# Patient Record
Sex: FEMALE | Race: WHITE | NOT HISPANIC OR LATINO | Employment: OTHER | URBAN - METROPOLITAN AREA
[De-identification: names, ages, dates, MRNs, and addresses within clinical notes are randomized per-mention and may not be internally consistent; named-entity substitution may affect disease eponyms.]

---

## 2017-02-15 ENCOUNTER — ALLSCRIPTS OFFICE VISIT (OUTPATIENT)
Dept: OTHER | Facility: OTHER | Age: 72
End: 2017-02-15

## 2017-02-16 ENCOUNTER — GENERIC CONVERSION - ENCOUNTER (OUTPATIENT)
Dept: OTHER | Facility: OTHER | Age: 72
End: 2017-02-16

## 2017-02-23 ENCOUNTER — GENERIC CONVERSION - ENCOUNTER (OUTPATIENT)
Dept: OTHER | Facility: OTHER | Age: 72
End: 2017-02-23

## 2017-03-01 ENCOUNTER — GENERIC CONVERSION - ENCOUNTER (OUTPATIENT)
Dept: OTHER | Facility: OTHER | Age: 72
End: 2017-03-01

## 2017-03-20 ENCOUNTER — TRANSCRIBE ORDERS (OUTPATIENT)
Dept: ADMINISTRATIVE | Facility: HOSPITAL | Age: 72
End: 2017-03-20

## 2017-03-20 DIAGNOSIS — R22.1 MASS OF LEFT SIDE OF NECK: Primary | ICD-10-CM

## 2017-03-24 ENCOUNTER — HOSPITAL ENCOUNTER (OUTPATIENT)
Dept: RADIOLOGY | Age: 72
Discharge: HOME | End: 2017-03-24
Payer: COMMERCIAL

## 2017-03-24 DIAGNOSIS — R22.1 MASS OF LEFT SIDE OF NECK: ICD-10-CM

## 2017-03-24 PROCEDURE — 70491 CT SOFT TISSUE NECK W/DYE: CPT

## 2017-03-24 RX ADMIN — IOHEXOL 85 ML: 350 INJECTION, SOLUTION INTRAVENOUS at 14:17

## 2017-03-29 ENCOUNTER — TRANSCRIBE ORDERS (OUTPATIENT)
Dept: ADMINISTRATIVE | Facility: HOSPITAL | Age: 72
End: 2017-03-29

## 2017-03-29 DIAGNOSIS — R22.1 MASS OF LEFT SIDE OF NECK: Primary | ICD-10-CM

## 2017-04-05 ENCOUNTER — HOSPITAL ENCOUNTER (OUTPATIENT)
Dept: ULTRASOUND IMAGING | Facility: HOSPITAL | Age: 72
Discharge: HOME | End: 2017-04-05
Attending: OTOLARYNGOLOGY
Payer: COMMERCIAL

## 2017-04-05 DIAGNOSIS — R22.1 MASS OF LEFT SIDE OF NECK: ICD-10-CM

## 2017-04-05 PROCEDURE — 88173 CYTOPATH EVAL FNA REPORT: CPT | Performed by: RADIOLOGY

## 2017-04-05 PROCEDURE — 88184 FLOWCYTOMETRY/ TC 1 MARKER: CPT | Performed by: PATHOLOGY

## 2017-04-05 PROCEDURE — 76942 ECHO GUIDE FOR BIOPSY: CPT

## 2017-04-05 PROCEDURE — 88185 FLOWCYTOMETRY/TC ADD-ON: CPT

## 2017-04-05 PROCEDURE — 10022 HB FNA W/IMAGE: CPT

## 2017-04-05 PROCEDURE — 88172 CYTP DX EVAL FNA 1ST EA SITE: CPT | Performed by: RADIOLOGY

## 2017-04-05 RX ORDER — LIDOCAINE HYDROCHLORIDE 10 MG/ML
5 INJECTION, SOLUTION INFILTRATION; PERINEURAL ONCE
Status: COMPLETED | OUTPATIENT
Start: 2017-04-05 | End: 2017-04-05

## 2017-04-05 RX ADMIN — LIDOCAINE HYDROCHLORIDE 5 ML: 10 INJECTION, SOLUTION INFILTRATION; PERINEURAL at 13:00

## 2017-04-07 LAB — SCAN RESULT: NORMAL

## 2017-04-19 ENCOUNTER — ALLSCRIPTS OFFICE VISIT (OUTPATIENT)
Dept: OTHER | Facility: OTHER | Age: 72
End: 2017-04-19

## 2017-04-19 DIAGNOSIS — Z86.59 PERSONAL HISTORY OF OTHER MENTAL AND BEHAVIORAL DISORDERS: ICD-10-CM

## 2017-04-19 DIAGNOSIS — C82.90 FOLLICULAR LYMPHOMA (HCC): ICD-10-CM

## 2017-04-20 ENCOUNTER — GENERIC CONVERSION - ENCOUNTER (OUTPATIENT)
Dept: OTHER | Facility: OTHER | Age: 72
End: 2017-04-20

## 2017-04-20 ENCOUNTER — TRANSCRIBE ORDERS (OUTPATIENT)
Dept: ADMINISTRATIVE | Facility: HOSPITAL | Age: 72
End: 2017-04-20

## 2017-04-20 DIAGNOSIS — Z86.59 PERSONAL HISTORY OF AFFECTIVE DISORDER: ICD-10-CM

## 2017-04-20 DIAGNOSIS — C82.80 OTHER TYPE OF FOLLICULAR LYMPHOMA, UNSPECIFIED BODY REGION (HCC): Primary | ICD-10-CM

## 2017-04-27 ENCOUNTER — HOSPITAL ENCOUNTER (OUTPATIENT)
Dept: RADIOLOGY | Age: 72
Discharge: HOME | End: 2017-04-27
Payer: COMMERCIAL

## 2017-04-27 DIAGNOSIS — Z86.59 PERSONAL HISTORY OF AFFECTIVE DISORDER: ICD-10-CM

## 2017-04-27 DIAGNOSIS — C82.80 OTHER TYPE OF FOLLICULAR LYMPHOMA, UNSPECIFIED BODY REGION (HCC): ICD-10-CM

## 2017-04-27 LAB — GLUCOSE SERPL-MCNC: 92 MG/DL (ref 65–140)

## 2017-04-27 PROCEDURE — A9552 F18 FDG: HCPCS

## 2017-04-27 PROCEDURE — 78815 PET IMAGE W/CT SKULL-THIGH: CPT

## 2017-04-27 PROCEDURE — 82948 REAGENT STRIP/BLOOD GLUCOSE: CPT

## 2017-04-27 RX ADMIN — IOHEXOL 5 ML: 240 INJECTION, SOLUTION INTRATHECAL; INTRAVASCULAR; INTRAVENOUS; ORAL at 13:35

## 2017-05-09 ENCOUNTER — APPOINTMENT (OUTPATIENT)
Dept: LAB | Facility: MEDICAL CENTER | Age: 72
End: 2017-05-09
Payer: COMMERCIAL

## 2017-05-09 DIAGNOSIS — C82.90 FOLLICULAR LYMPHOMA (HCC): ICD-10-CM

## 2017-05-09 DIAGNOSIS — Z86.59 PERSONAL HISTORY OF OTHER MENTAL AND BEHAVIORAL DISORDERS: ICD-10-CM

## 2017-05-09 LAB
ALBUMIN SERPL BCP-MCNC: 3.6 G/DL (ref 3.5–5)
ALP SERPL-CCNC: 104 U/L (ref 46–116)
ALT SERPL W P-5'-P-CCNC: 30 U/L (ref 12–78)
ANION GAP SERPL CALCULATED.3IONS-SCNC: 7 MMOL/L (ref 4–13)
AST SERPL W P-5'-P-CCNC: 22 U/L (ref 5–45)
BASOPHILS # BLD AUTO: 0.02 THOUSANDS/ΜL (ref 0–0.1)
BASOPHILS NFR BLD AUTO: 0 % (ref 0–1)
BILIRUB SERPL-MCNC: 0.26 MG/DL (ref 0.2–1)
BUN SERPL-MCNC: 18 MG/DL (ref 5–25)
CALCIUM SERPL-MCNC: 9.1 MG/DL (ref 8.3–10.1)
CHLORIDE SERPL-SCNC: 103 MMOL/L (ref 100–108)
CO2 SERPL-SCNC: 28 MMOL/L (ref 21–32)
CREAT SERPL-MCNC: 0.68 MG/DL (ref 0.6–1.3)
EOSINOPHIL # BLD AUTO: 0.07 THOUSAND/ΜL (ref 0–0.61)
EOSINOPHIL NFR BLD AUTO: 1 % (ref 0–6)
ERYTHROCYTE [DISTWIDTH] IN BLOOD BY AUTOMATED COUNT: 14 % (ref 11.6–15.1)
GFR SERPL CREATININE-BSD FRML MDRD: >60 ML/MIN/1.73SQ M
GLUCOSE P FAST SERPL-MCNC: 97 MG/DL (ref 65–99)
HCT VFR BLD AUTO: 42.7 % (ref 34.8–46.1)
HGB BLD-MCNC: 14.4 G/DL (ref 11.5–15.4)
LYMPHOCYTES # BLD AUTO: 1.72 THOUSANDS/ΜL (ref 0.6–4.47)
LYMPHOCYTES NFR BLD AUTO: 30 % (ref 14–44)
MCH RBC QN AUTO: 33.1 PG (ref 26.8–34.3)
MCHC RBC AUTO-ENTMCNC: 33.7 G/DL (ref 31.4–37.4)
MCV RBC AUTO: 98 FL (ref 82–98)
MONOCYTES # BLD AUTO: 0.71 THOUSAND/ΜL (ref 0.17–1.22)
MONOCYTES NFR BLD AUTO: 12 % (ref 4–12)
NEUTROPHILS # BLD AUTO: 3.18 THOUSANDS/ΜL (ref 1.85–7.62)
NEUTS SEG NFR BLD AUTO: 57 % (ref 43–75)
NRBC BLD AUTO-RTO: 0 /100 WBCS
PLATELET # BLD AUTO: 235 THOUSANDS/UL (ref 149–390)
PMV BLD AUTO: 11.1 FL (ref 8.9–12.7)
POTASSIUM SERPL-SCNC: 4.3 MMOL/L (ref 3.5–5.3)
PROT SERPL-MCNC: 7.3 G/DL (ref 6.4–8.2)
RBC # BLD AUTO: 4.35 MILLION/UL (ref 3.81–5.12)
SODIUM SERPL-SCNC: 138 MMOL/L (ref 136–145)
WBC # BLD AUTO: 5.71 THOUSAND/UL (ref 4.31–10.16)

## 2017-05-09 PROCEDURE — 80053 COMPREHEN METABOLIC PANEL: CPT

## 2017-05-09 PROCEDURE — 36415 COLL VENOUS BLD VENIPUNCTURE: CPT

## 2017-05-09 PROCEDURE — 85025 COMPLETE CBC W/AUTO DIFF WBC: CPT

## 2017-05-10 ENCOUNTER — ALLSCRIPTS OFFICE VISIT (OUTPATIENT)
Dept: OTHER | Facility: OTHER | Age: 72
End: 2017-05-10

## 2017-05-31 ENCOUNTER — ALLSCRIPTS OFFICE VISIT (OUTPATIENT)
Dept: OTHER | Facility: OTHER | Age: 72
End: 2017-05-31

## 2017-06-07 ENCOUNTER — TRANSCRIBE ORDERS (OUTPATIENT)
Dept: ADMINISTRATIVE | Facility: HOSPITAL | Age: 72
End: 2017-06-07

## 2017-06-07 DIAGNOSIS — R59.0 BILATERAL HILAR ADENOPATHY SYNDROME: Primary | ICD-10-CM

## 2017-06-12 ENCOUNTER — GENERIC CONVERSION - ENCOUNTER (OUTPATIENT)
Dept: OTHER | Facility: OTHER | Age: 72
End: 2017-06-12

## 2017-06-15 ENCOUNTER — ALLSCRIPTS OFFICE VISIT (OUTPATIENT)
Dept: OTHER | Facility: OTHER | Age: 72
End: 2017-06-15

## 2017-06-27 ENCOUNTER — GENERIC CONVERSION - ENCOUNTER (OUTPATIENT)
Dept: OTHER | Facility: OTHER | Age: 72
End: 2017-06-27

## 2017-06-28 ENCOUNTER — GENERIC CONVERSION - ENCOUNTER (OUTPATIENT)
Dept: OTHER | Facility: OTHER | Age: 72
End: 2017-06-28

## 2017-07-17 ENCOUNTER — ALLSCRIPTS OFFICE VISIT (OUTPATIENT)
Dept: OTHER | Facility: OTHER | Age: 72
End: 2017-07-17

## 2017-10-02 DIAGNOSIS — R91.1 SOLITARY PULMONARY NODULE: ICD-10-CM

## 2017-10-11 ENCOUNTER — TRANSCRIBE ORDERS (OUTPATIENT)
Dept: ADMINISTRATIVE | Facility: HOSPITAL | Age: 72
End: 2017-10-11

## 2017-10-11 DIAGNOSIS — E04.1 LEFT THYROID NODULE: Primary | ICD-10-CM

## 2017-10-11 DIAGNOSIS — R91.1 LUNG NODULE: Primary | ICD-10-CM

## 2017-10-17 ENCOUNTER — GENERIC CONVERSION - ENCOUNTER (OUTPATIENT)
Dept: OTHER | Facility: OTHER | Age: 72
End: 2017-10-17

## 2017-10-17 LAB
AMBIG ABBREV CMP14 DEFAULT (HISTORICAL): NORMAL
BASOPHILS # BLD AUTO: 0 %
BASOPHILS # BLD AUTO: 0 X10E3/UL (ref 0–0.2)
DEPRECATED RDW RBC AUTO: 13.5 % (ref 12.3–15.4)
EOSINOPHIL # BLD AUTO: 0.1 X10E3/UL (ref 0–0.4)
EOSINOPHIL # BLD AUTO: 1 %
HCT VFR BLD AUTO: 43.2 % (ref 34–46.6)
HGB BLD-MCNC: 14.9 G/DL (ref 11.1–15.9)
IMM.GRANULOCYTES (CD4/8) (HISTORICAL): 0 %
IMM.GRANULOCYTES (CD4/8) (HISTORICAL): 0 X10E3/UL (ref 0–0.1)
LYMPHOCYTES # BLD AUTO: 2.1 X10E3/UL (ref 0.7–3.1)
LYMPHOCYTES # BLD AUTO: 33 %
MCH RBC QN AUTO: 32.9 PG (ref 26.6–33)
MCHC RBC AUTO-ENTMCNC: 34.5 G/DL (ref 31.5–35.7)
MCV RBC AUTO: 95 FL (ref 79–97)
MONOCYTES # BLD AUTO: 0.6 X10E3/UL (ref 0.1–0.9)
MONOCYTES (HISTORICAL): 10 %
NEUTROPHILS # BLD AUTO: 3.5 X10E3/UL (ref 1.4–7)
NEUTROPHILS # BLD AUTO: 56 %
PLATELET # BLD AUTO: 230 X10E3/UL (ref 150–379)
RBC (HISTORICAL): 4.53 X10E6/UL (ref 3.77–5.28)
WBC # BLD AUTO: 6.2 X10E3/UL (ref 3.4–10.8)

## 2017-10-18 ENCOUNTER — GENERIC CONVERSION - ENCOUNTER (OUTPATIENT)
Dept: OTHER | Facility: OTHER | Age: 72
End: 2017-10-18

## 2017-10-18 ENCOUNTER — TRANSCRIBE ORDERS (OUTPATIENT)
Dept: ADMINISTRATIVE | Facility: HOSPITAL | Age: 72
End: 2017-10-18

## 2017-10-18 DIAGNOSIS — C82.90 FOLLICULAR LYMPHOMA, UNSPECIFIED FOLLICULAR LYMPHOMA TYPE, UNSPECIFIED BODY REGION (HCC): Primary | ICD-10-CM

## 2017-10-18 LAB
A/G RATIO (HISTORICAL): 2.5 (ref 1.2–2.2)
ALBUMIN SERPL BCP-MCNC: 4.7 G/DL (ref 3.5–4.8)
ALP SERPL-CCNC: 91 IU/L (ref 39–117)
ALT SERPL W P-5'-P-CCNC: 21 IU/L (ref 0–32)
AST SERPL W P-5'-P-CCNC: 26 IU/L (ref 0–40)
BILIRUB SERPL-MCNC: 0.4 MG/DL (ref 0–1.2)
BUN SERPL-MCNC: 16 MG/DL (ref 8–27)
BUN/CREA RATIO (HISTORICAL): 22 (ref 12–28)
CALCIUM SERPL-MCNC: 9.5 MG/DL (ref 8.7–10.3)
CHLORIDE SERPL-SCNC: 96 MMOL/L (ref 96–106)
CO2 SERPL-SCNC: 25 MMOL/L (ref 18–29)
CREAT SERPL-MCNC: 0.74 MG/DL (ref 0.57–1)
EGFR AFRICAN AMERICAN (HISTORICAL): 94 ML/MIN/1.73
EGFR-AMERICAN CALC (HISTORICAL): 82 ML/MIN/1.73
GLUCOSE SERPL-MCNC: 96 MG/DL (ref 65–99)
LDH (HISTORICAL): 198 IU/L (ref 119–226)
POTASSIUM SERPL-SCNC: 4.1 MMOL/L (ref 3.5–5.2)
SODIUM SERPL-SCNC: 139 MMOL/L (ref 134–144)
TOT. GLOBULIN, SERUM (HISTORICAL): 1.9 G/DL (ref 1.5–4.5)
TOTAL PROTEIN (HISTORICAL): 6.6 G/DL (ref 6–8.5)

## 2017-10-20 ENCOUNTER — HOSPITAL ENCOUNTER (OUTPATIENT)
Dept: RADIOLOGY | Age: 72
Discharge: HOME | End: 2017-10-20
Payer: COMMERCIAL

## 2017-10-20 ENCOUNTER — APPOINTMENT (OUTPATIENT)
Dept: RADIOLOGY | Age: 72
End: 2017-10-20
Payer: COMMERCIAL

## 2017-10-20 DIAGNOSIS — C82.90 FOLLICULAR LYMPHOMA, UNSPECIFIED FOLLICULAR LYMPHOMA TYPE, UNSPECIFIED BODY REGION (HCC): ICD-10-CM

## 2017-10-20 DIAGNOSIS — E04.1 LEFT THYROID NODULE: ICD-10-CM

## 2017-10-20 PROCEDURE — 70491 CT SOFT TISSUE NECK W/DYE: CPT

## 2017-10-20 PROCEDURE — 76536 US EXAM OF HEAD AND NECK: CPT

## 2017-10-20 PROCEDURE — 71260 CT THORAX DX C+: CPT

## 2017-10-20 RX ADMIN — IOHEXOL 95 ML: 350 INJECTION, SOLUTION INTRAVENOUS at 15:33

## 2017-10-31 ENCOUNTER — APPOINTMENT (OUTPATIENT)
Dept: RADIATION ONCOLOGY | Facility: HOSPITAL | Age: 72
End: 2017-10-31
Attending: RADIOLOGY
Payer: COMMERCIAL

## 2017-10-31 ENCOUNTER — GENERIC CONVERSION - ENCOUNTER (OUTPATIENT)
Dept: OTHER | Facility: OTHER | Age: 72
End: 2017-10-31

## 2017-10-31 PROCEDURE — G0463 HOSPITAL OUTPT CLINIC VISIT: HCPCS | Performed by: RADIOLOGY

## 2017-10-31 PROCEDURE — 99214 OFFICE O/P EST MOD 30 MIN: CPT | Performed by: RADIOLOGY

## 2017-11-06 ENCOUNTER — GENERIC CONVERSION - ENCOUNTER (OUTPATIENT)
Dept: OTHER | Facility: OTHER | Age: 72
End: 2017-11-06

## 2017-11-06 DIAGNOSIS — R91.1 SOLITARY PULMONARY NODULE: ICD-10-CM

## 2017-12-26 ENCOUNTER — ALLSCRIPTS OFFICE VISIT (OUTPATIENT)
Dept: OTHER | Facility: OTHER | Age: 72
End: 2017-12-26

## 2017-12-26 ENCOUNTER — GENERIC CONVERSION - ENCOUNTER (OUTPATIENT)
Dept: OTHER | Facility: OTHER | Age: 72
End: 2017-12-26

## 2017-12-26 DIAGNOSIS — M25.551 PAIN IN RIGHT HIP: ICD-10-CM

## 2017-12-26 DIAGNOSIS — C82.90 FOLLICULAR LYMPHOMA (HCC): ICD-10-CM

## 2017-12-26 DIAGNOSIS — M54.50 LOW BACK PAIN: ICD-10-CM

## 2017-12-26 DIAGNOSIS — I65.22 OCCLUSION AND STENOSIS OF LEFT CAROTID ARTERY: ICD-10-CM

## 2017-12-26 DIAGNOSIS — M25.552 PAIN IN LEFT HIP: ICD-10-CM

## 2017-12-26 DIAGNOSIS — Z00.00 ENCOUNTER FOR GENERAL ADULT MEDICAL EXAMINATION WITHOUT ABNORMAL FINDINGS: ICD-10-CM

## 2017-12-26 DIAGNOSIS — R91.1 SOLITARY PULMONARY NODULE: ICD-10-CM

## 2017-12-26 DIAGNOSIS — Z13.220 ENCOUNTER FOR SCREENING FOR LIPOID DISORDERS: ICD-10-CM

## 2018-01-09 ENCOUNTER — GENERIC CONVERSION - ENCOUNTER (OUTPATIENT)
Dept: OTHER | Facility: OTHER | Age: 73
End: 2018-01-09

## 2018-01-09 NOTE — MISCELLANEOUS
Message   Recorded as Task   Date: 02/23/2017 10:41 AM, Created By: ROMAN VILLAGRAN   Task Name: Call Back   Assigned To: Tess Nicole   Regarding Patient: Ruby Rae, Status: In Progress   Comment:    Maria Alejandra Zhoua - 23 Feb 2017 10:41 AM     TASK CREATED  Pt called and stated she just spoke to you but she has another question  #: [phone]   CoreyTess - 23 Feb 2017 11:34 AM     TASK IN PROGRESS   CoreyTess - 23 Feb 2017 11:47 AM     TASK EDITED  spoke with patient  she wants appt with ENT in [employer]  they do not have appt until April    She does not want to go to [facility] to see  [place],," it is my obsessive complex"    She wants to make an apt    I gave her the contact info for Dr Geovany Bae and Dr Shanti Osei    I urged her to make appt ASAP        Active Problems    1  Amaurosis fugax of right eye (362 34) (G45 3)   2  Anxiety (300 00) (F41 9)   3  Atherosclerosis of native artery of right lower extremity with ulceration of other part of foot   (440 23,707 9) (I70 235)   4  Cancer of upper lobe of right lung (162 3) (C34 11)   5  Carotid artery stenosis (433 10) (I65 29)   6  COPD exacerbation (491 21) (J44 1)   7  Diastasis recti (728 84) (M62 08)   8  Encounter for screening colonoscopy (V76 51) (Z12 11)   9  Encounter for screening mammogram for breast cancer (V76 12) (Z12 31)   10  Follicular lymphoma (098 57) (C82 90)   11  History of OCD (obsessive compulsive disorder) (V11 2) (Z86 59)   12  Impacted cerumen (380 4) (H61 20)   13  Inguinal pain (789 09) (R10 30)   14  Irritable bowel syndrome (564 1) (K58 9)   15  Lung nodule (793 11) (R91 1)   16  Nicotine dependence (305 1) (F17 200)   17  Otalgia, unspecified laterality (388 70) (H92 09)   18  Other emphysema (492 8) (J43 8)   19  Otitis externa (380 10) (H60 90)   20  Painful joint (719 40) (M25 50)   21  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)   22  PTSD (post-traumatic stress disorder) (309 81) (F43 10)   23   Scoliosis (579 30) (M41 9)    Current Meds   1  Amoxicillin 500 MG Oral Capsule; TAKE 1 CAPSULE DAILY; Therapy: 86CZR6617 to (Evaluate:94Isw5772)  Requested for: 17Aug2016; Last   Rx:38Ujj1429 Ordered   2  Omeprazole 40 MG Oral Capsule Delayed Release; TAKE 1 CAPSULE DAILY; Therapy: 06LNW6706 to (Evaluate:21Acp4657)  Requested for: 91EJE3666; Last   Rx:03Rld4446 Ordered    Allergies    1  Flagyl CAPS   2  Avelox TABS   3  Penicillins    4  Grass   5  Other   6   Trees    Signatures   Electronically signed by : Anisa Jimenez, ; Feb 23 2017 11:47AM EST                       (Author)

## 2018-01-10 LAB
A/G RATIO (HISTORICAL): 1.6 (ref 1.2–2.2)
ALBUMIN SERPL BCP-MCNC: 4.4 G/DL (ref 3.5–4.8)
ALP SERPL-CCNC: 110 IU/L (ref 39–117)
ALT SERPL W P-5'-P-CCNC: 24 IU/L (ref 0–32)
AST SERPL W P-5'-P-CCNC: 30 IU/L (ref 0–40)
BASOPHILS # BLD AUTO: 0 %
BASOPHILS # BLD AUTO: 0 X10E3/UL (ref 0–0.2)
BILIRUB SERPL-MCNC: 0.3 MG/DL (ref 0–1.2)
BUN SERPL-MCNC: 13 MG/DL (ref 8–27)
BUN/CREA RATIO (HISTORICAL): 18 (ref 12–28)
CALCIUM SERPL-MCNC: 9.5 MG/DL (ref 8.7–10.3)
CHLORIDE SERPL-SCNC: 99 MMOL/L (ref 96–106)
CHOLEST SERPL-MCNC: 269 MG/DL (ref 100–199)
CO2 SERPL-SCNC: 26 MMOL/L (ref 18–29)
CREAT SERPL-MCNC: 0.74 MG/DL (ref 0.57–1)
DEPRECATED RDW RBC AUTO: 13.2 % (ref 12.3–15.4)
EGFR AFRICAN AMERICAN (HISTORICAL): 94 ML/MIN/1.73
EGFR-AMERICAN CALC (HISTORICAL): 81 ML/MIN/1.73
EOSINOPHIL # BLD AUTO: 0 %
EOSINOPHIL # BLD AUTO: 0 X10E3/UL (ref 0–0.4)
GLUCOSE SERPL-MCNC: 104 MG/DL (ref 65–99)
HCT VFR BLD AUTO: 44.7 % (ref 34–46.6)
HDLC SERPL-MCNC: 101 MG/DL
HGB BLD-MCNC: 14.7 G/DL (ref 11.1–15.9)
IMM.GRANULOCYTES (CD4/8) (HISTORICAL): 0 %
IMM.GRANULOCYTES (CD4/8) (HISTORICAL): 0 X10E3/UL (ref 0–0.1)
LDH (HISTORICAL): 196 IU/L (ref 119–226)
LDLC SERPL CALC-MCNC: 153 MG/DL (ref 0–99)
LYMPHOCYTES # BLD AUTO: 1.7 X10E3/UL (ref 0.7–3.1)
LYMPHOCYTES # BLD AUTO: 23 %
MCH RBC QN AUTO: 32.5 PG (ref 26.6–33)
MCHC RBC AUTO-ENTMCNC: 32.9 G/DL (ref 31.5–35.7)
MCV RBC AUTO: 99 FL (ref 79–97)
MONOCYTES # BLD AUTO: 0.6 X10E3/UL (ref 0.1–0.9)
MONOCYTES (HISTORICAL): 8 %
NEUTROPHILS # BLD AUTO: 5 X10E3/UL (ref 1.4–7)
NEUTROPHILS # BLD AUTO: 69 %
PLATELET # BLD AUTO: 234 X10E3/UL (ref 150–379)
POTASSIUM SERPL-SCNC: 4.5 MMOL/L (ref 3.5–5.2)
RBC (HISTORICAL): 4.53 X10E6/UL (ref 3.77–5.28)
SODIUM SERPL-SCNC: 140 MMOL/L (ref 134–144)
TOT. GLOBULIN, SERUM (HISTORICAL): 2.7 G/DL (ref 1.5–4.5)
TOTAL PROTEIN (HISTORICAL): 7.1 G/DL (ref 6–8.5)
TRIGL SERPL-MCNC: 74 MG/DL (ref 0–149)
VLDLC SERPL CALC-MCNC: 15 MG/DL (ref 5–40)
WBC # BLD AUTO: 7.4 X10E3/UL (ref 3.4–10.8)

## 2018-01-10 NOTE — PROGRESS NOTES
Discussion/Summary  Social Work-Discussion Summary [facility]: Patient is being seen for an ongoing assessment/follow up  LSW contacted pt as a follow up by phone on 3/1/2017  Pt confirmed she made ENT appointment with Dr Lolita Livingston  Pt denied barriers to attending this appointment  LSW provided list additional mental health providers in her area as she is having difficulty finding a provider accepting patients at this time  Pt was also provided phone number for [facility] for support at [phone] and crisis hotline at [phone] in case of psychiatric emergency  LSW is available to provide cancer care counseling as needed to pt        Signatures   Electronically signed by : LEVI Khan; Mar  1 2017  3:18PM EST                       (Author)

## 2018-01-10 NOTE — MISCELLANEOUS
Message   Recorded as Task   Date: 02/23/2017 09:51 AM, Created By: Caroline Morrison   Task Name: Call Back   Assigned To: Tess Nicole   Regarding Patient: Chino Romero, Status: Active   Comment:    SIDPDDELIA MOREJON - 23 Feb 2017 9:51 AM     TASK CREATED  PT called requesting a call back she states it is complicated and she will like to discuss with nurse instead  [phone]   Tess Nicole - 23 Feb 2017 10:08 AM     TASK EDITED  she is seeing an ENT in [address] [place] [address] on 2/27/17 for evaluation for a biopsy        Active Problems    1  Amaurosis fugax of right eye (362 34) (G45 3)   2  Anxiety (300 00) (F41 9)   3  Atherosclerosis of native artery of right lower extremity with ulceration of other part of foot   (440 23,707 9) (I70 235)   4  Cancer of upper lobe of right lung (162 3) (C34 11)   5  Carotid artery stenosis (433 10) (I65 29)   6  COPD exacerbation (491 21) (J44 1)   7  Diastasis recti (728 84) (M62 08)   8  Encounter for screening colonoscopy (V76 51) (Z12 11)   9  Encounter for screening mammogram for breast cancer (V76 12) (Z12 31)   10  Follicular lymphoma (820 79) (C82 90)   11  History of OCD (obsessive compulsive disorder) (V11 2) (Z86 59)   12  Impacted cerumen (380 4) (H61 20)   13  Inguinal pain (789 09) (R10 30)   14  Irritable bowel syndrome (564 1) (K58 9)   15  Lung nodule (793 11) (R91 1)   16  Nicotine dependence (305 1) (F17 200)   17  Otalgia, unspecified laterality (388 70) (H92 09)   18  Other emphysema (492 8) (J43 8)   19  Otitis externa (380 10) (H60 90)   20  Painful joint (719 40) (M25 50)   21  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)   22  PTSD (post-traumatic stress disorder) (309 81) (F43 10)   23  Scoliosis (737 30) (M41 9)    Current Meds   1  Amoxicillin 500 MG Oral Capsule; TAKE 1 CAPSULE DAILY; Therapy: 90NHY2208 to (Evaluate:16Oct2016)  Requested for: 17Aug2016; Last   Rx:17Aug2016 Ordered   2  Omeprazole 40 MG Oral Capsule Delayed Release; TAKE 1 CAPSULE DAILY;    Therapy: 06CEI7472 to (Evaluate:32Mao1667)  Requested for: 28KSU2204; Last   Rx:20Oct2015 Ordered    Allergies    1  Flagyl CAPS   2  Avelox TABS   3  Penicillins    4  Grass   5  Other   6   Trees    Signatures   Electronically signed by : Rachel Elmore, ; Feb 23 2017 10:08AM EST                       (Author)

## 2018-01-10 NOTE — MISCELLANEOUS
Message  patient darinel , wants a name of another surgeon besides carmel Mario and adrianna with contact information      Active Problems    1  Amaurosis fugax of right eye (362 34) (G45 3)   2  Anxiety (300 00) (F41 9)   3  Atherosclerosis of native artery of right lower extremity with ulceration of other part of foot   (440 23,707 9) (I70 235)   4  Cancer of upper lobe of right lung (162 3) (C34 11)   5  Carotid artery stenosis (433 10) (I65 29)   6  COPD exacerbation (491 21) (J44 1)   7  Diastasis recti (728 84) (M62 08)   8  Encounter for screening colonoscopy (V76 51) (Z12 11)   9  Encounter for screening mammogram for breast cancer (V76 12) (Z12 31)   10  Follicular lymphoma (844 64) (C82 90)   11  History of OCD (obsessive compulsive disorder) (V11 2) (Z86 59)   12  Impacted cerumen (380 4) (H61 20)   13  Inguinal pain (789 09) (R10 30)   14  Irritable bowel syndrome (564 1) (K58 9)   15  Lung nodule (793 11) (R91 1)   16  Lymphadenopathy, cervical (785 6) (R59 0)   17  Nicotine dependence (305 1) (F17 200)   18  Otalgia, unspecified laterality (388 70) (H92 09)   19  Other emphysema (492 8) (J43 8)   20  Otitis externa (380 10) (H60 90)   21  Painful joint (719 40) (M25 50)   22  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)   23  PTSD (post-traumatic stress disorder) (309 81) (F43 10)   24  Scoliosis (737 30) (M41 9)    Current Meds   1  Omeprazole 40 MG Oral Capsule Delayed Release; TAKE 1 CAPSULE DAILY; Therapy: 96OKU1555 to (Evaluate:26Rjb3475)  Requested for: 13YEB3506; Last   Rx:58Jlz3638 Ordered   2  Vicodin 5-300 MG Oral Tablet; TAKE 1 TABLET EVERY 4 TO 6 HOURS AS NEEDED   FOR PAIN;   Therapy: 10IAY6120 to (Evaluate:46Hbi7813); Last Rx:47Rec2562 Ordered    Allergies    1  Flagyl CAPS   2  Avelox TABS   3  Penicillins    4  Grass   5  Other   6   Trees    Signatures   Electronically signed by : Monique Dangelo, ; Jun 27 2017  3:38PM EST                       (Author)

## 2018-01-11 NOTE — PROGRESS NOTES
Preliminary Nursing Report                Patient Information    Initial Encounter Entry Date:   2017 2:48 PM EST (Automated Transmission Automated Transmission)       Last Modified:   {Fredi Panda}              Legal Name: Leandra Hall        Social  Number:        YOB: 1945        Age (years): 70        Gender: F        Body Mass Index (BMI): 20 kg/m2        Height: 59 in  Weight: 97 lbs (44 kgs)           Address:   [address] [id number] [place]              Phone: [phone]   (consent to leave messages)        Email:        Ethnicity: Decline to State        Latter-day:        Marital Status:        Preferred Language: English        Race: Other Race                    Patient Insurance Information        Primary Insurance Information Carrier Name: {Primary  CarrierName}           Carrier Address:   {Primary  CarrierAddress}              Carrier Phone: {Primary  CarrierPhone}          Group Number: {Primary  GroupNumber}          Policy Number: {Primary  PolicyNumber}          Insured Name: {Primary  InsuredName}          Insured : {Primary  InsuredDOB}          Relationship to Insured: {Primary  RelationshiptoInsured}           Secondary Insurance Information Carrier Name: {Secondary  CarrierName}           Carrier Address:   {Secondary  CarrierAddress}              Carrier Phone: {Secondary  CarrierPhone}          Group Number: {Secondary  GroupNumber}          Policy Number: {Secondary  PolicyNumber}          Insured Name: {Secondary  InsuredName}          Insured : {Secondary  InsuredDOB}          Relationship to Insured: {Secondary  RelationshiptoInsured}                       Health Profile   Booking #:   Madi Pope #: [id number]               DOS: 2017    Surgery : BIOPSY/REMOVAL,LYMPH NODE(S)      Add'l Procedures/Notes:     Surgery Risk: Intermediate          Precautions     PTSD (post-traumatic stress disorder)                   Allergies    Avelox TABS       Clinical Comments: Reaction Type: , Reaction: , Severity:        Flagyl CAPS       Clinical Comments: Reaction Type: , Reaction: , Severity:        Grass       Other       Penicillins       Trees       Clinical Comments: Reaction Type: , Reaction: , Severity:              Medications    Omeprazole 40 MG Oral Capsule Delayed Release       Vicodin 5-300 MG Oral Tablet               Conditions    Amaurosis fugax of right eye       Anxiety       Atherosclerosis of native artery of right lower extremity with ulceration of other part of foot       Cancer of upper lobe of right lung       Carotid artery stenosis       COPD exacerbation       Diastasis recti       Encounter for screening colonoscopy       Encounter for screening mammogram for breast cancer       Follicular lymphoma       History of OCD (obsessive compulsive disorder)       Impacted cerumen       Inguinal pain       Irritable bowel syndrome       Lung nodule       Lymphadenopathy, cervical       Nicotine dependence       Otalgia, unspecified laterality       Other emphysema       Otitis externa       Painful joint       Postherpetic polyneuropathy       PTSD (post-traumatic stress disorder)       Scoliosis               Family History    None             Surgical History    None             Social History    Current Every Day Smoker       Clinical Comments: Pt stated smokes 1/2 ppd for 30+ yrs;        Denies Alcohol Use (History)       Denies Caffeine Use       Denies Drug use       Recovering From Drug Addiction                               Patient Instructions       Medical Procedure Risk  NPO Instructions   The day before surgery it is recommended to have a light dinner at your usual time and you are allowed a light snack early in the evening  Do not eat anything heavy or eat a big meal after 7pm  Do not eat or drink anything after midnight prior to your surgery  If you are supposed to take any of your medications, do so with a sip of water  Failure to follow these instructions can lead to an increased risk of lung complications and may result in a delay or cancellation of your procedure  If you have any questions, contact your institution for further instructions  No candy, no gum, no mints, no chewing tobacco          Vicodin 5-300 MG Oral Tablet  Medication Instruction (Opioids - Pain Medication) 62  Please continue the following medications, if needed, up to and including the day of surgery (with a sip of water)  Nicotine dependence  Smoking Cessation   Smoking before and after surgery can lead to complications  Patients who quit smoking at least eight weeks before surgery have complication rates almost as low as non-smokers  Smokers who can stop smoking 24 or 48 hours before surgery may also benefit from decreased amounts of nicotine and carbon monoxide in the body  For help quitting smoking, speak with your physician or contact the [facility] Katie Romero or American [employer] Association  Please visit the following web address for assistance with quitting  [employer] be  [email or web address]  aspx  Testing Considerations       ? Chest X-ray (CXR) t  Consider a Chest X-Ray (CXR) if patient is having respiratory symptoms  Triggered by: Cancer of upper lobe of right lung, Carotid artery stenosis, Follicular lymphoma, Lung nodule         ? Coagulation Tests (PT/PTT/INR) t  Triggered by: Carotid artery stenosis, Follicular lymphoma         ? Complete Blood Count (CBC) t, client, client  If test was completed and normal within last six months, repeat test is not necessary  Triggered by: Carotid artery stenosis, Follicular lymphoma, Age or Facility Rec         ? Comprehensive Metabolic Panel (CMP) t  If test was completed and normal within last six months, repeat test is not necessary  Triggered by: Cancer of upper lobe of right lung, Follicular lymphoma         ?  Electrocardiogram (ECG) t  Patient does not need new test if normal ECG is present within the last six months and no change in clinical condition  Triggered by: Cancer of upper lobe of right lung, Carotid artery stenosis, Age or Facility Rec         ? Type and Screen client  Type and Screen - Blood: If there is anticipated or possible large blood loss with this procedure, then a Type and Screen for Blood should be ordered  Triggered by: Age or Facility Rec               Consultations       ? Cardiac Evaluation   Further evaluation of cardiopulmonary status should be strongly considered  Triggered by: Carotid artery stenosis         ? Primary Care Physician Evaluation   Primary care physician may need to evaluate patient prior to surgery  This is likely NOT necessary if the listed conditions are chronic and stable  Triggered by: Follicular lymphoma, Lung nodule, Otalgia, unspecified laterality               Miscellaneous Questions         Question: Are you able to walk up a flight of stairs, walk up a hill or do heavy housework WITHOUT having chest pain or shortness of breath? Answer: YES                   Allergies/Conditions/Medications Not Found        The following were not recognized by our system when generating the recommendations  Please consider if this would impact any preoperative protocols  ? COPD exacerbation       ? Current Every Day Smoker       ? Denies Alcohol Use (History)       ? Denies Caffeine Use       ? Encounter for screening mammogram for breast cancer       ? History of OCD (obsessive compulsive disorder)       ? Lymphadenopathy, cervical       ? Recovering From Drug Addiction                  Appointment Information         Date:    07/05/2017        Location:    [facility]        Address:           Directions:                      Footnotes revision 14      ?? Denotes a free-text entry        Legal Disclaimer: Any and all recommendations and services provided herein are designed to assist in the preoperative care of the patient  Nothing contained herein is designed to replace, eliminate or alleviate the responsibility of the attending physician to supervise and determine the patient?s preoperative care and course of treatment  Failure to provide complete, accurate information may negatively impact the system?s ability to recommend the proper preoperative protocol  THE ATTENDING PHYSICIAN IS RESPONSIBLE TO REVIEW THE SUGGESTED PREOPERATIVE PROTOCOLS/COURSE OF TREATMENT AND PRESCRIBE THE FINAL COURSE OF PREOPERATIVE TREATMENT IN CONSULTATION WITH THE PATIENT  THE ePREOP SYSTEM AND ITS MATERIALS ARE PROVIDED ? AS IS? WITHOUT WARRANTY OF ANY KIND, EXPRESS OR IMPLIED, INCLUDING, BUT NOT LIMITED TO, WARRANTIES OF PERFORMANCE OR MERCHANTABILITY OR FITNESS FOR A PARTICULAR PURPOSE  PATIENT AND PHYSICIANS HEREBY AGREE THAT THEIR USE OF THE MATERIALS AND RESOURCES ACT AS A CONSENT TO RELEASE AND WAIVE ePREOP FROM ANY AND ALL CLAIMS OF WARRANTY, TORT OR CONTRACT LAW OF ANY KIND  Electronically signed by:Víctor MOREJON    Jun 13 2017 10:54AM EST

## 2018-01-12 VITALS
OXYGEN SATURATION: 98 % | HEIGHT: 59 IN | TEMPERATURE: 97.3 F | DIASTOLIC BLOOD PRESSURE: 68 MMHG | WEIGHT: 97 LBS | SYSTOLIC BLOOD PRESSURE: 110 MMHG | BODY MASS INDEX: 19.56 KG/M2 | RESPIRATION RATE: 16 BRPM | HEART RATE: 85 BPM

## 2018-01-12 NOTE — MISCELLANEOUS
Message   Recorded as Task   Date: 10/18/2017 10:07 AM, Created By: Jamia Chao   Task Name: Care Coordination   Assigned To: Tess Nicole   Regarding Patient: Robin Abebe, Status: Active   Comment:    Tess Nicole - 18 Oct 2017 10:07 AM     TASK CREATED  cancel PET scan (denied by insurance)    please shedule ct chest and neck       patient is expecting your call   Carroll Kidd - 18 Oct 2017 10:56 AM     TASK REPLIED TO: Previously Assigned To Carroll Kidd  Scheduled patient for 10 20 17 at 3pm for both  Pt is aware  Anthony Ovalle is aware so she can get the [employer] [facility]  Active Problems    1  Amaurosis fugax of right eye (362 34) (G45 3)   2  Anxiety (300 00) (F41 9)   3  Atherosclerosis of native artery of right lower extremity with ulceration of other part of foot   (440 23,707 9) (I70 235)   4  Cancer of upper lobe of right lung (162 3) (C34 11)   5  Carotid artery stenosis (433 10) (I65 29)   6  COPD exacerbation (491 21) (J44 1)   7  Diastasis recti (728 84) (M62 08)   8  Encounter for screening colonoscopy (V76 51) (Z12 11)   9  Encounter for screening mammogram for breast cancer (V76 12) (Z12 31)   10  Follicular lymphoma (448 65) (C82 90)   11  History of OCD (obsessive compulsive disorder) (V11 2) (Z86 59)   12  Impacted cerumen (380 4) (H61 20)   13  Inguinal pain (789 09) (R10 30)   14  Irritable bowel syndrome (564 1) (K58 9)   15  Lung nodule (793 11) (R91 1)   16  Lymphadenopathy, cervical (785 6) (R59 0)   17  Nicotine dependence (305 1) (F17 200)   18  Otalgia, unspecified laterality (388 70) (H92 09)   19  Other emphysema (492 8) (J43 8)   20  Otitis externa (380 10) (H60 90)   21  Painful joint (719 40) (M25 50)   22  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

## 2018-01-12 NOTE — MISCELLANEOUS
Message  Patient did see ENT  He thought that her ear problem was related to TMJ, her ear seems to be fine  I do not think of this because patient stated that her symptoms began at the time her ear was cleaned out  It was suggested that she use ibuprofen to help with her symptoms        Signatures   Electronically signed by : LETICIA Srinivasan ; Mar 24 2016  9:50AM EST                       (Author)

## 2018-01-13 VITALS
HEART RATE: 61 BPM | BODY MASS INDEX: 19.6 KG/M2 | WEIGHT: 97.25 LBS | RESPIRATION RATE: 16 BRPM | DIASTOLIC BLOOD PRESSURE: 64 MMHG | SYSTOLIC BLOOD PRESSURE: 110 MMHG | HEIGHT: 59 IN | OXYGEN SATURATION: 99 % | TEMPERATURE: 96 F

## 2018-01-13 NOTE — MISCELLANEOUS
Message   Recorded as Task   Date: 04/20/2017 02:16 PM, Created By: Anali Massey   Task Name: Miscellaneous   Assigned To: Belkis Jones   Regarding Patient: Phuong Rose, Status: Active   Comment:    MaryseMary - 20 Apr 2017 2:16 PM     TASK CREATED  Caller: Self; Other; [phone] (Home)  CALLING TO ADVISE YOU THAT HER PET SCAN IS SCHEDULED FOR NEXT THURS, 4/27/17 AT [facility]  Active Problems    1  Amaurosis fugax of right eye (362 34) (G45 3)   2  Anxiety (300 00) (F41 9)   3  Atherosclerosis of native artery of right lower extremity with ulceration of other part of foot   (440 23,707 9) (I70 235)   4  Cancer of upper lobe of right lung (162 3) (C34 11)   5  Carotid artery stenosis (433 10) (I65 29)   6  COPD exacerbation (491 21) (J44 1)   7  Diastasis recti (728 84) (M62 08)   8  Encounter for screening colonoscopy (V76 51) (Z12 11)   9  Encounter for screening mammogram for breast cancer (V76 12) (Z12 31)   10  Follicular lymphoma (528 24) (C82 90)   11  History of OCD (obsessive compulsive disorder) (V11 2) (Z86 59)   12  Impacted cerumen (380 4) (H61 20)   13  Inguinal pain (789 09) (R10 30)   14  Irritable bowel syndrome (564 1) (K58 9)   15  Lung nodule (793 11) (R91 1)   16  Nicotine dependence (305 1) (F17 200)   17  Otalgia, unspecified laterality (388 70) (H92 09)   18  Other emphysema (492 8) (J43 8)   19  Otitis externa (380 10) (H60 90)   20  Painful joint (719 40) (M25 50)   21  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)   22  PTSD (post-traumatic stress disorder) (309 81) (F43 10)   23  Scoliosis (737 30) (M41 9)    Current Meds   1  Amoxicillin 500 MG Oral Capsule; TAKE 1 CAPSULE DAILY; Therapy: 48RSJ5255 to (Evaluate:16Oct2016)  Requested for: 81Yzm1652; Last   Rx:47Mdj8172 Ordered   2  Omeprazole 40 MG Oral Capsule Delayed Release; TAKE 1 CAPSULE DAILY; Therapy: 17IVB5766 to (Evaluate:38Uwh7636)  Requested for: 80NKJ8274; Last   Rx:20Oct2015 Ordered    Allergies    1   Flagyl CAPS   2  Avelox TABS   3  Penicillins    4  Grass   5  Other   6   Trees    Signatures   Electronically signed by : David Jeong, ; Apr 20 2017  2:22PM EST                       (Author)

## 2018-01-14 VITALS
SYSTOLIC BLOOD PRESSURE: 100 MMHG | OXYGEN SATURATION: 97 % | HEART RATE: 76 BPM | TEMPERATURE: 96.8 F | RESPIRATION RATE: 16 BRPM | HEIGHT: 59 IN | WEIGHT: 99 LBS | DIASTOLIC BLOOD PRESSURE: 60 MMHG | BODY MASS INDEX: 19.96 KG/M2

## 2018-01-14 VITALS
TEMPERATURE: 99.4 F | WEIGHT: 97.5 LBS | RESPIRATION RATE: 16 BRPM | BODY MASS INDEX: 19.66 KG/M2 | HEART RATE: 87 BPM | HEIGHT: 59 IN | DIASTOLIC BLOOD PRESSURE: 54 MMHG | SYSTOLIC BLOOD PRESSURE: 86 MMHG | OXYGEN SATURATION: 98 %

## 2018-01-14 VITALS
HEART RATE: 78 BPM | SYSTOLIC BLOOD PRESSURE: 105 MMHG | DIASTOLIC BLOOD PRESSURE: 64 MMHG | BODY MASS INDEX: 19.56 KG/M2 | TEMPERATURE: 98.8 F | HEIGHT: 59 IN | RESPIRATION RATE: 16 BRPM | WEIGHT: 97 LBS

## 2018-01-14 VITALS
OXYGEN SATURATION: 98 % | BODY MASS INDEX: 19.45 KG/M2 | TEMPERATURE: 98.7 F | HEIGHT: 59 IN | SYSTOLIC BLOOD PRESSURE: 102 MMHG | DIASTOLIC BLOOD PRESSURE: 66 MMHG | RESPIRATION RATE: 16 BRPM | WEIGHT: 96.5 LBS | HEART RATE: 80 BPM

## 2018-01-15 VITALS
BODY MASS INDEX: 19.96 KG/M2 | HEART RATE: 80 BPM | RESPIRATION RATE: 16 BRPM | HEIGHT: 59 IN | SYSTOLIC BLOOD PRESSURE: 110 MMHG | TEMPERATURE: 97.5 F | DIASTOLIC BLOOD PRESSURE: 62 MMHG | OXYGEN SATURATION: 97 % | WEIGHT: 99 LBS

## 2018-01-15 NOTE — MISCELLANEOUS
Message   Recorded as Task   Date: 06/27/2017 02:11 PM, Created By: Maribel Palm   Task Name: Care Coordination   Assigned To: Holly Espino   Regarding Patient: Frances Parry, Status: Active   Comment:    Maggy Lerner - 27 Jun 2017 2:11 PM     TASK CREATED  Patient called 6/27/17 and stated she wants to cancel surgery  Please call and follow up with patient   [facility] - 28 Jun 2017 2:56 PM     TASK EDITED  Per med onc nurse note, pt seeking surgery elsewhere  Procedure with Dr Angie Candelaria cancelled  Active Problems    1  Amaurosis fugax of right eye (362 34) (G45 3)   2  Anxiety (300 00) (F41 9)   3  Atherosclerosis of native artery of right lower extremity with ulceration of other part of foot   (440 23,707 9) (I70 235)   4  Cancer of upper lobe of right lung (162 3) (C34 11)   5  Carotid artery stenosis (433 10) (I65 29)   6  COPD exacerbation (491 21) (J44 1)   7  Diastasis recti (728 84) (M62 08)   8  Encounter for screening colonoscopy (V76 51) (Z12 11)   9  Encounter for screening mammogram for breast cancer (V76 12) (Z12 31)   10  Follicular lymphoma (599 88) (C82 90)   11  History of OCD (obsessive compulsive disorder) (V11 2) (Z86 59)   12  Impacted cerumen (380 4) (H61 20)   13  Inguinal pain (789 09) (R10 30)   14  Irritable bowel syndrome (564 1) (K58 9)   15  Lung nodule (793 11) (R91 1)   16  Lymphadenopathy, cervical (785 6) (R59 0)   17  Nicotine dependence (305 1) (F17 200)   18  Otalgia, unspecified laterality (388 70) (H92 09)   19  Other emphysema (492 8) (J43 8)   20  Otitis externa (380 10) (H60 90)   21  Painful joint (719 40) (M25 50)   22  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)   23  PTSD (post-traumatic stress disorder) (309 81) (F43 10)   24  Scoliosis (737 30) (M41 9)    Current Meds   1  Omeprazole 40 MG Oral Capsule Delayed Release; TAKE 1 CAPSULE DAILY; Therapy: 73ZCL7490 to (Evaluate:08Qwz1764)  Requested for: 71VCC2826; Last   Rx:20Oct2015 Ordered   2   Vicodin 5-300 MG Oral Tablet; TAKE 1 TABLET EVERY 4 TO 6 HOURS AS NEEDED   FOR PAIN;   Therapy: 00GLR2653 to (Evaluate:05Jun2017); Last Rx:71Shz4776 Ordered    Allergies    1  Flagyl CAPS   2  Avelox TABS   3  Penicillins    4  Grass   5  Other   6  Trees    Signatures   Electronically signed by :  Edythe Moritz, ; Jun 28 2017  2:56PM EST                       (Author)

## 2018-01-15 NOTE — PROGRESS NOTES
History of Present Illness  Care Coordination Encounter Information:   Type of Encounter: Telephonic   Contact: Initial Contact    Spoke to Patient  Care Coordination SL Nurse [facility]: Contacted patient for refusal to schedule vascular consult  Patient states she does not want to schedule at this time and will call central scheduling or the vascular center when she is ready to schedule  Provider tasked to make aware of continued refusal       Active Problems    1  Anxiety (300 00) (F41 9)   2  Carotid artery stenosis (433 10) (I65 29)   3  COPD exacerbation (491 21) (J44 1)   4  Diastasis recti (728 84) (M62 08)   5  Encounter for screening colonoscopy (V76 51) (Z12 11)   6  Encounter for screening mammogram for breast cancer (V76 12) (Z12 31)   7  Follicular lymphoma (317 33) (C82 90)   8  Impacted cerumen (380 4) (H61 20)   9  Inguinal pain (789 09) (R10 30)   10  Irritable bowel syndrome (564 1) (K58 9)   11  Lung nodule (793 11) (R91 1)   12  Otalgia, unspecified laterality (388 70) (H92 09)   13  Other emphysema (492 8) (J43 8)   14  Otitis externa (380 10) (H60 90)   15  Painful joint (719 40) (M25 50)   16  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)   17  Scoliosis (737 30) (M41 9)    Past Medical History    1  History of Acute otitis media, unspecified laterality   2  Acute sinusitis (461 9) (J01 90)   3  Carotid artery stenosis (433 10) (I65 29)   4  History of peptic ulcer (V12 71) (Z87 11)   5  Personal history of Helicobacter infection (V12 09) (Z86 19)    Surgical History    1  History of Appendectomy   2  History of Cholecystectomy   3  History of Gallbladder Surgery   4  History of Thyroid Surgery Jose-Thyroidectomy    Family History  Mother    1  Family history of Diabetes (250 00) (E11 9)   2  Family history of Heart disease (429 9) (I51 9)  Brother    3   Family history of Heart disease (429 9) (I51 9)    Social History    · Denied: Alcohol Use (History)   · Denied: Caffeine Use   · Current Every Day Smoker   · Denied: Drug use (305 90) (F19 90)   · Recovering From Drug Addiction    Current Meds    1  PHENobarbital 16 2 MG Oral Tablet; Take 1 tablet 4 times daily; Therapy: 48Zdw0592 to (Evaluate:05Pyy7124); Last Rx:14Apr2016 Ordered    2  PredniSONE 20 MG Oral Tablet; TAKE 1 TABLET DAILY; Therapy: 87KKK4914 to (Evaluate:41Dut2069); Last Rx:23Feb2016 Ordered   3  Ventolin  (90 Base) MCG/ACT Inhalation Aerosol Solution; INHALE 1 TO 2 PUFFS   EVERY 4 TO 6 HOURS AS NEEDED; Therapy: 10ZJQ4004 to (Last Rx:23Feb2016) Ordered    4  Amoxicillin 500 MG Oral Capsule; one capsule TID times 14 days; Therapy: 65SOF1095 to (Last Rx:15Mar2016)  Requested for: 69NZK6551 Ordered    5  Ciprodex 0 3-0 1 % Otic Suspension; INSTILL 3 DROPS IN AFFECTED EAR(S) TWICE   DAILY; Therapy: 00FHU3127 to (Evaluate:29Mar2016); Last Rx:15Mar2016 Ordered    6  Omeprazole 40 MG Oral Capsule Delayed Release; TAKE 1 CAPSULE DAILY; Therapy: 51NPE4079 to (Evaluate:54Yon2714)  Requested for: 97WTR3152; Last   Rx:20Oct2015 Ordered    7  Nystatin-Triamcinolone 903524-4 1 UNIT/GM-% External Cream; APPLY TO AFFECTED   AREA TWICE A DAY; Therapy: 38AAR5664 to (Evaluate:19Nov2015)  Requested for: 20Oct2015; Last   Rx:20Oct2015 Ordered    Allergies    1  Flagyl CAPS   2  Avelox TABS   3  Penicillins    Health Management   COLONOSCOPY; every 10 years; Next Due: 79BFY1944; Overdue   Medicare Annual Wellness Visit; every 1 year; Next Due: 82SDP5732; Overdue    End of Encounter Meds    1  PHENobarbital 16 2 MG Oral Tablet; Take 1 tablet 4 times daily; Therapy: 68Rij0769 to (Evaluate:62Cpn2161); Last Rx:14Apr2016 Ordered    2  PredniSONE 20 MG Oral Tablet; TAKE 1 TABLET DAILY; Therapy: 97JTL4138 to (Evaluate:45Ijq5372); Last Rx:23Feb2016 Ordered   3  Ventolin  (90 Base) MCG/ACT Inhalation Aerosol Solution; INHALE 1 TO 2 PUFFS   EVERY 4 TO 6 HOURS AS NEEDED; Therapy: 54SXW3897 to (Last Rx:34Cgx2543) Ordered    4  Amoxicillin 500 MG Oral Capsule; one capsule TID times 14 days; Therapy: 38RWJ7279 to (Last Rx:15Mar2016)  Requested for: 62SVL1620 Ordered    5  Ciprodex 0 3-0 1 % Otic Suspension; INSTILL 3 DROPS IN AFFECTED EAR(S) TWICE   DAILY; Therapy: 89KYV9549 to (Evaluate:29Mar2016); Last Rx:15Mar2016 Ordered    6  Omeprazole 40 MG Oral Capsule Delayed Release; TAKE 1 CAPSULE DAILY; Therapy: 09SSA9753 to (Evaluate:57Zyq5938)  Requested for: 10API1165; Last   Rx:20Oct2015 Ordered    7  Nystatin-Triamcinolone 198977-7 1 UNIT/GM-% External Cream; APPLY TO AFFECTED   AREA TWICE A DAY; Therapy: 41LMI7664 to (Evaluate:19Nov2015)  Requested for: 20Oct2015; Last   Rx:20Oct2015 Ordered    Future Appointments    Date/Time Provider Specialty Site   08/17/2016 02:00 PM LETICIA Canchola  Hematology Oncology CANCER CARE [facility] MEDICAL ONCOLOGY     Message   Recorded as Task  Date: 04/14/2016 10:49 AM, Created By: System  Task Name: Schedule Appointment  Assigned To: Thea Taylor  Regarding Patient: Lorenzo Canales, Status: In Progress  Comment:   System - 14 Apr 2016 10:49 AM    Preferred Communication: Home Phone  Ordering Site: [place] [address]    Referred To:  Ryan Branch MD, Sundar Ramirez  Vascular Surgery  To Be Done: 14 Apr 2016  [facility] - 14 Apr 2016 11:31 AM    TASK IN PROGRESS  Lo Alexandra - 14 Apr 2016 1:35 PM    TASK EDITED  Patient does not want to schedule this appointment she is being referred for Carotid Artery Stenosis 50-69%  Sneha Cummings - 15 Apr 2016 8:56 AM    TASK REASSIGNED: Previously Assigned To JAYDEN Hairston 1 - 15 Apr 2016 3:22 PM    TASK IN PROGRESS     Signatures   Electronically signed by : Albertina Scanlon RN; Apr 19 2016  3:22PM EST                       (Author)    Electronically signed by : Albertina Scanlon RN; May  2 2016  9:54AM EST                       (Author)

## 2018-01-15 NOTE — RESULT NOTES
Message  Abnormal vascular study indicating blockage of right subclavian as well as stenosis of carotid arteries have suggested that neurosurgery consultation      Signatures   Electronically signed by : LETICIA Aguirre ; Apr 14 2016  9:24AM EST                       (Author)

## 2018-01-16 NOTE — PROGRESS NOTES
Discussion/Summary  Social Work-Discussion Summary [facility]: Patient is being seen for an ongoing assessment/follow up  Pt is known to LSW briefly from rad onc  Pt contacted LSW for psychosocial support due to issues coping with recent referral to ENT and biopsy for lymph nodes/mass  Pt lives alone and denied she has a support system  Pt did mention she has a brother and two children, although she does not have a close relationship with them  Pt reports she has "OCD" and "PTSD"  Pt stopped mental health treatment as she feels medications cannot help her and the psychotherapist she was seeing was "not qualified to treat PTSD " Pt discussed having issues functioning in daily life due to her anxiety  LSW provided pt supportive counseling and encouraged pt to reconnect to mental healthcare due to the severity of her anxiety symptoms and limited social support  LSW provided pt list of psychotherapist that treat anxiety disorders in her area  Pt agreed to reestablish mental healthcare  Pt was encouraged to contact LSW if assistance is needed doing so  Pt discussed financial stressors and denied she qualified for lieap or the on track program  Pt was provided temporary financial assistance from the [facility]  LSW is available to provide cancer care counseling as needed        Signatures   Electronically signed by : LEVI García; Feb 20 2017  2:08PM EST                       (Author)

## 2018-01-18 ENCOUNTER — GENERIC CONVERSION - ENCOUNTER (OUTPATIENT)
Dept: OTHER | Facility: OTHER | Age: 73
End: 2018-01-18

## 2018-01-18 ENCOUNTER — HOSPITAL ENCOUNTER (OUTPATIENT)
Dept: RADIOLOGY | Age: 73
Discharge: HOME | End: 2018-01-18
Payer: COMMERCIAL

## 2018-01-18 VITALS — WEIGHT: 98 LBS | BODY MASS INDEX: 19.79 KG/M2

## 2018-01-18 DIAGNOSIS — R91.1 LUNG NODULE: ICD-10-CM

## 2018-01-18 DIAGNOSIS — C82.90 FOLLICULAR LYMPHOMA (HCC): ICD-10-CM

## 2018-01-18 LAB — GLUCOSE SERPL-MCNC: 95 MG/DL (ref 65–140)

## 2018-01-18 PROCEDURE — A9552 F18 FDG: HCPCS

## 2018-01-18 PROCEDURE — 78815 PET IMAGE W/CT SKULL-THIGH: CPT

## 2018-01-18 PROCEDURE — 82948 REAGENT STRIP/BLOOD GLUCOSE: CPT

## 2018-01-18 RX ADMIN — IOHEXOL 5 ML: 240 INJECTION, SOLUTION INTRATHECAL; INTRAVASCULAR; INTRAVENOUS; ORAL at 13:05

## 2018-01-22 VITALS
WEIGHT: 99 LBS | HEIGHT: 59 IN | DIASTOLIC BLOOD PRESSURE: 70 MMHG | RESPIRATION RATE: 16 BRPM | HEART RATE: 87 BPM | SYSTOLIC BLOOD PRESSURE: 104 MMHG | BODY MASS INDEX: 19.96 KG/M2 | TEMPERATURE: 97.9 F | OXYGEN SATURATION: 98 %

## 2018-01-22 VITALS
HEIGHT: 59 IN | OXYGEN SATURATION: 98 % | TEMPERATURE: 97.4 F | WEIGHT: 101 LBS | SYSTOLIC BLOOD PRESSURE: 108 MMHG | HEART RATE: 85 BPM | DIASTOLIC BLOOD PRESSURE: 72 MMHG | BODY MASS INDEX: 20.36 KG/M2

## 2018-01-24 VITALS
WEIGHT: 101 LBS | RESPIRATION RATE: 18 BRPM | BODY MASS INDEX: 20.36 KG/M2 | TEMPERATURE: 97.4 F | OXYGEN SATURATION: 98 % | DIASTOLIC BLOOD PRESSURE: 72 MMHG | SYSTOLIC BLOOD PRESSURE: 108 MMHG | HEIGHT: 59 IN

## 2018-01-29 ENCOUNTER — OFFICE VISIT (OUTPATIENT)
Dept: HEMATOLOGY ONCOLOGY | Facility: CLINIC | Age: 73
End: 2018-01-29
Payer: COMMERCIAL

## 2018-01-29 VITALS
HEIGHT: 59 IN | BODY MASS INDEX: 20.36 KG/M2 | TEMPERATURE: 97.9 F | DIASTOLIC BLOOD PRESSURE: 62 MMHG | SYSTOLIC BLOOD PRESSURE: 102 MMHG | RESPIRATION RATE: 16 BRPM | HEART RATE: 73 BPM | WEIGHT: 101 LBS

## 2018-01-29 DIAGNOSIS — C82.35 GRADE 3A FOLLICULAR LYMPHOMA OF LYMPH NODES OF INGUINAL REGION (HCC): Primary | ICD-10-CM

## 2018-01-29 DIAGNOSIS — R91.1 INCIDENTAL LUNG NODULE, GREATER THAN OR EQUAL TO 8MM: ICD-10-CM

## 2018-01-29 PROBLEM — C85.90 LYMPHOMA (HCC): Status: ACTIVE | Noted: 2018-01-29

## 2018-01-29 PROCEDURE — 99214 OFFICE O/P EST MOD 30 MIN: CPT | Performed by: INTERNAL MEDICINE

## 2018-01-29 NOTE — LETTER
January 29, 2018     Cecile Lynn, [address]  [place] [address]    Patient: Hyun Records   YOB: 1945   Date of Visit: 1/29/2018       Dear Dr Ata Yeboah:    Thank you for referring Jaclyn Gonzalez to me for evaluation  Below are my notes for this consultation  If you have questions, please do not hesitate to call me  I look forward to following your patient along with you  Sincerely,        Butch Taylor MD        CC: MD Butch Nolen MD  1/29/2018  2:23 PM  Sign at close encounter  Hematology Outpatient Follow - Up Note  Hyun Records 67 y o  female MRN: @ Encounter: [id number]        Date:  1/29/2018        Assessment/ Plan:  1  Follicular lymphoma stage IV diagnosed initially in 2009 with with constitutional symptoms she received single rituximab in September 2010 4 weekly doses with excellent response, by CT scan/PET scan, then she relapsed with inguinal lymphadenopathy treated with Eloy Brands /rituximab for 4 cycles complicated with herpes zoster status post radiation therapy to that area finished in August 2013, no evidence of disease by physical examination, normal LDH or lab criteria   2  Enlarging right lower lobe pulmonary nodule with uptake on the PET scan most likely consistent with malignancy, patient to be evaluated by Radiation Oncology for SB RT  3  Follow-up in 6 months with CBC, CMP, LDH            HPI:   #1  stage IV follicular lymphoma diagnosed in year of 2009 with B  symptoms received single agent rituximab in September 2010, 4 weekly doses with excellent response CT/PET scan in June 2012 showed significant decrease in lymph node size but persistent right inguinal lymphadenopathy she was treated with Treanda/rituximab for 4 cycles complicated with herpes zoster involving the right cervical, shoulder, breast area   Repeat PET scan showed persistent activity in the right inguinal area consistent with recurrent lymphoma patient received radiation therapy to that area and she is here for discussion  Radiation therapy finished in August 2013  #2  Obsessive-compulsive disorder  #3  Nicotine abuse  #4  Atherosclerosis of the Aorta  #5  Kyphosis  #6  Right upper lobe pulmonary nodule enlarging, the patient declined surgery in October 2015, biopsy was inconclusive no evidence of malignancy or lymphoma, However because of the appearance she had stereotactic radiation therapy  Interval History: She came for PET scan result which showed an enlarging with significant uptake in the right lower lobe lung nodule measuring 8 mm       Previous Treatment:         Test Results:    Imaging: Nm Pet Ct Skull Base To Mid Thigh    Result Date: 1/18/2018  Narrative: PET/CT SCAN INDICATION: R91 1: Solitary pulmonary nodule E09 27: Follicular lymphoma, unspecified, unspecified site J09 62: Follicular lymphoma, unspecified, lymph nodes of head, face, and neck  History taken directly from the electronic ordering system  Abnormal CT, increasing right lower lung nodule, restaging for treatment management MODIFIER:     PS COMPARISON: CT 10/20/2017 and priors, including PET CT 4/72/6095 CELL TYPE:  Follicular lymphoma, left cervical node biopsy 3/16/2010 TECHNIQUE:   8 1 mCi F-18-FDG administered IV  Multiplanar attenuation corrected and non attenuation corrected PET images are available for interpretation, and contiguous, low dose, axial CT sections were obtained from the vertex through the femurs following the administration of oral contrast material (7 5 cc Omnipaque-240 in 300 cc water)  Intravenous contrast material was not utilized  This examination, like all CT scans performed in the [facility], was performed utilizing techniques to minimize radiation dose exposure, including the use of iterative reconstruction and automated exposure control  Fasting serum glucose: 95 mg/dl FINDINGS: VISUALIZED BRAIN:   No acute abnormalities are seen   HEAD/NECK: Resolved left upper cervical hypermetabolic adenopathy  Stable mild right upper cervical hypermetabolic focus, SUV 3 1, prior study 3 4  No new FDG avid cervical adenopathy is seen  CT images: Otherwise stable  CHEST:   Stable mildly hypermetabolic right upper anterior pleural parenchymal densities, SUV 1 3, prior study 1 3, may represent posttreatment inflammatory changes  9 x 8 mm right lower lung nodule demonstrates increased hypermetabolism, SUV 3 3  Prior measurement 4 mm, SUV 0 9  This is most compatible with malignancy, possibly a primary lung malignancy  Resolved hypermetabolic soft tissue along the left lower paraspinal thoracic spine, SUV measuring 1 in this region, appearing similar to background soft tissue activity, prior SUV 2 8  Unchanged scattered areas of hilar and mediastinal FDG activity, SUV measuring up to 2 6, prior SUV 2 6  No new hypermetabolic lesions in this region  Small subcentimeter right axillary node with minimal FDG activity, SUV 1 4, may be reactive or related to the right upper extremity injection  This may be reassessed on follow-up exam  CT images: Emphysema  Coronary artery calcifications  ABDOMEN:   Somewhat nodular focus of activity in the right upper quadrant of the abdomen, SUV 4 2, may be related to the duodenum  This may be reassessed on follow-up exam   Diffuse gastric activity is again noted, likely physiologic  No additional FDG avid soft tissue lesions are seen  CT images: Stable  PELVIS: No FDG avid soft tissue lesions are seen  CT images: Stable  OSSEOUS STRUCTURES: No FDG avid lesions are seen  CT images: Spine degenerative change and scoliosis  Degenerative change in the hands  Impression: 1  Increased size and hypermetabolism of right lower lung nodule, most concerning for malignancy  2   Small mildly hypermetabolic right axillary node, possibly just reactive or related to the right upper extremity injection    This may be reassessed on follow-up exam to exclude progression  3   Stable distribution of mild hilar and mediastinal activity without new hypermetabolic adenopathy  4   Resolved left upper cervical hypermetabolic adenopathy  Stable focus of activity in the right upper neck  5   Resolved hypermetabolic soft tissue along the left paraspinal thoracic spine  Workstation performed: [id number]       Labs:   Lab Results   Component Value Date    WBC 7 4 01/09/2018    HGB 14 7 01/09/2018    HCT 44 7 01/09/2018    MCV 99 (H) 01/09/2018     01/09/2018     Lab Results   Component Value Date     01/09/2018    K 4 5 01/09/2018    CL 99 01/09/2018    CO2 26 01/09/2018    ANIONGAP 7 05/09/2017    BUN 13 01/09/2018    CREATININE 0 74 01/09/2018    GLUCOSE 104 (H) 01/09/2018    GLUF 97 05/09/2017    CALCIUM 9 5 01/09/2018    AST 30 01/09/2018    ALT 24 01/09/2018    ALKPHOS 110 01/09/2018    PROT 7 1 01/09/2018    BILITOT 0 3 01/09/2018    EGFR >60 0 05/09/2017        No results found for: IRON, TIBC, FERRITIN    No results found for: LUEPCZQH62      ROS:   Review of Systems   Constitutional: Negative for chills, diaphoresis, fatigue and fever  Respiratory: Negative for cough, choking, shortness of breath and wheezing  Musculoskeletal: Positive for back pain  Negative for gait problem, joint swelling, myalgias and neck pain  All other systems reviewed and are negative  Current Medications: Reviewed  Allergies: Reviewed  PMH/FH/SH:  Reviewed      Physical Exam:    Body surface area is 1 38 meters squared      Wt Readings from Last 3 Encounters:   01/29/18 45 8 kg (101 lb)   01/18/18 44 5 kg (98 lb)   12/26/17 45 8 kg (101 lb)        Temp Readings from Last 3 Encounters:   01/29/18 97 9 °F (36 6 °C) (Tympanic)   12/26/17 (!) 97 4 °F (36 3 °C)   12/26/17 (!) 97 4 °F (36 3 °C)        BP Readings from Last 3 Encounters:   01/29/18 102/62   12/26/17 108/72   12/26/17 108/72         Pulse Readings from Last 3 Encounters:   01/29/18 73 12/26/17 85   11/06/17 87        Physical Exam   Constitutional: No distress  Under weight   HENT:   Head: Normocephalic  Eyes: Pupils are equal, round, and reactive to light  Neck: Normal range of motion  Cardiovascular: Normal rate  Pulmonary/Chest: Effort normal    Abdominal: Soft  Musculoskeletal: Normal range of motion  Pronounced kyphosis   Neurological: She is alert  Skin: She is not diaphoretic  Goals and Barriers:  Current Goal: Minimize effects of disease  Barriers: None  Patient's Capacity to Self Care:  Patient is able to self care      Code Status: [unfilled]

## 2018-01-29 NOTE — PROGRESS NOTES
Hematology Outpatient Follow - Up Note  Alexa Hoyos 67 y o  female MRN: @ Encounter: [id number]        Date:  1/29/2018        Assessment/ Plan:  1  Follicular lymphoma stage IV diagnosed initially in 2009 with with constitutional symptoms she received single rituximab in September 2010 4 weekly doses with excellent response, by CT scan/PET scan, then she relapsed with inguinal lymphadenopathy treated with Reeda Weir /rituximab for 4 cycles complicated with herpes zoster status post radiation therapy to that area finished in August 2013, no evidence of disease by physical examination, normal LDH or lab criteria   2  Enlarging right lower lobe pulmonary nodule with uptake on the PET scan most likely consistent with malignancy, patient to be evaluated by Radiation Oncology for SB RT  3  Follow-up in 6 months with CBC, CMP, LDH            HPI:   #1  stage IV follicular lymphoma diagnosed in year of 2009 with B  symptoms received single agent rituximab in September 2010, 4 weekly doses with excellent response CT/PET scan in June 2012 showed significant decrease in lymph node size but persistent right inguinal lymphadenopathy she was treated with Treanda/rituximab for 4 cycles complicated with herpes zoster involving the right cervical, shoulder, breast area  Repeat PET scan showed persistent activity in the right inguinal area consistent with recurrent lymphoma patient received radiation therapy to that area and she is here for discussion  Radiation therapy finished in August 2013  #2  Obsessive-compulsive disorder  #3  Nicotine abuse  #4  Atherosclerosis of the Aorta  #5  Kyphosis  #6  Right upper lobe pulmonary nodule enlarging, the patient declined surgery in October 2015, biopsy was inconclusive no evidence of malignancy or lymphoma, However because of the appearance she had stereotactic radiation therapy       Interval History: She came for PET scan result which showed an enlarging with significant uptake in the right lower lobe lung nodule measuring 8 mm       Previous Treatment:         Test Results:    Imaging: Nm Pet Ct Skull Base To Mid Thigh    Result Date: 1/18/2018  Narrative: PET/CT SCAN INDICATION: R91 1: Solitary pulmonary nodule W84 84: Follicular lymphoma, unspecified, unspecified site F59 84: Follicular lymphoma, unspecified, lymph nodes of head, face, and neck  History taken directly from the electronic ordering system  Abnormal CT, increasing right lower lung nodule, restaging for treatment management MODIFIER:     PS COMPARISON: CT 10/20/2017 and priors, including PET CT 3/27/2063 CELL TYPE:  Follicular lymphoma, left cervical node biopsy 3/16/2010 TECHNIQUE:   8 1 mCi F-18-FDG administered IV  Multiplanar attenuation corrected and non attenuation corrected PET images are available for interpretation, and contiguous, low dose, axial CT sections were obtained from the vertex through the femurs following the administration of oral contrast material (7 5 cc Omnipaque-240 in 300 cc water)  Intravenous contrast material was not utilized  This examination, like all CT scans performed in the [facility], was performed utilizing techniques to minimize radiation dose exposure, including the use of iterative reconstruction and automated exposure control  Fasting serum glucose: 95 mg/dl FINDINGS: VISUALIZED BRAIN:   No acute abnormalities are seen  HEAD/NECK:   Resolved left upper cervical hypermetabolic adenopathy  Stable mild right upper cervical hypermetabolic focus, SUV 3 1, prior study 3 4  No new FDG avid cervical adenopathy is seen  CT images: Otherwise stable  CHEST:   Stable mildly hypermetabolic right upper anterior pleural parenchymal densities, SUV 1 3, prior study 1 3, may represent posttreatment inflammatory changes  9 x 8 mm right lower lung nodule demonstrates increased hypermetabolism, SUV 3 3  Prior measurement 4 mm, SUV 0 9    This is most compatible with malignancy, possibly a primary lung malignancy  Resolved hypermetabolic soft tissue along the left lower paraspinal thoracic spine, SUV measuring 1 in this region, appearing similar to background soft tissue activity, prior SUV 2 8  Unchanged scattered areas of hilar and mediastinal FDG activity, SUV measuring up to 2 6, prior SUV 2 6  No new hypermetabolic lesions in this region  Small subcentimeter right axillary node with minimal FDG activity, SUV 1 4, may be reactive or related to the right upper extremity injection  This may be reassessed on follow-up exam  CT images: Emphysema  Coronary artery calcifications  ABDOMEN:   Somewhat nodular focus of activity in the right upper quadrant of the abdomen, SUV 4 2, may be related to the duodenum  This may be reassessed on follow-up exam   Diffuse gastric activity is again noted, likely physiologic  No additional FDG avid soft tissue lesions are seen  CT images: Stable  PELVIS: No FDG avid soft tissue lesions are seen  CT images: Stable  OSSEOUS STRUCTURES: No FDG avid lesions are seen  CT images: Spine degenerative change and scoliosis  Degenerative change in the hands  Impression: 1  Increased size and hypermetabolism of right lower lung nodule, most concerning for malignancy  2   Small mildly hypermetabolic right axillary node, possibly just reactive or related to the right upper extremity injection  This may be reassessed on follow-up exam to exclude progression  3   Stable distribution of mild hilar and mediastinal activity without new hypermetabolic adenopathy  4   Resolved left upper cervical hypermetabolic adenopathy  Stable focus of activity in the right upper neck  5   Resolved hypermetabolic soft tissue along the left paraspinal thoracic spine      Workstation performed: [id number]       Labs:   Lab Results   Component Value Date    WBC 7 4 01/09/2018    HGB 14 7 01/09/2018    HCT 44 7 01/09/2018    MCV 99 (H) 01/09/2018     01/09/2018     Lab Results   Component Value Date     01/09/2018    K 4 5 01/09/2018    CL 99 01/09/2018    CO2 26 01/09/2018    ANIONGAP 7 05/09/2017    BUN 13 01/09/2018    CREATININE 0 74 01/09/2018    GLUCOSE 104 (H) 01/09/2018    GLUF 97 05/09/2017    CALCIUM 9 5 01/09/2018    AST 30 01/09/2018    ALT 24 01/09/2018    ALKPHOS 110 01/09/2018    PROT 7 1 01/09/2018    BILITOT 0 3 01/09/2018    EGFR >60 0 05/09/2017        No results found for: IRON, TIBC, FERRITIN    No results found for: VOMPJOSR03      ROS:   Review of Systems   Constitutional: Negative for chills, diaphoresis, fatigue and fever  Respiratory: Negative for cough, choking, shortness of breath and wheezing  Musculoskeletal: Positive for back pain  Negative for gait problem, joint swelling, myalgias and neck pain  All other systems reviewed and are negative  Current Medications: Reviewed  Allergies: Reviewed  PMH/FH/SH:  Reviewed      Physical Exam:    Body surface area is 1 38 meters squared  Wt Readings from Last 3 Encounters:   01/29/18 45 8 kg (101 lb)   01/18/18 44 5 kg (98 lb)   12/26/17 45 8 kg (101 lb)        Temp Readings from Last 3 Encounters:   01/29/18 97 9 °F (36 6 °C) (Tympanic)   12/26/17 (!) 97 4 °F (36 3 °C)   12/26/17 (!) 97 4 °F (36 3 °C)        BP Readings from Last 3 Encounters:   01/29/18 102/62   12/26/17 108/72   12/26/17 108/72         Pulse Readings from Last 3 Encounters:   01/29/18 73   12/26/17 85   11/06/17 87        Physical Exam   Constitutional: No distress  Under weight   HENT:   Head: Normocephalic  Eyes: Pupils are equal, round, and reactive to light  Neck: Normal range of motion  Cardiovascular: Normal rate  Pulmonary/Chest: Effort normal    Abdominal: Soft  Musculoskeletal: Normal range of motion  Pronounced kyphosis   Neurological: She is alert  Skin: She is not diaphoretic  Goals and Barriers:  Current Goal: Minimize effects of disease  Barriers: None        Patient's Capacity to Self Care:  Patient is able to self care      Code Status: [unfilled]

## 2018-01-31 ENCOUNTER — TELEPHONE (OUTPATIENT)
Dept: HEMATOLOGY ONCOLOGY | Facility: CLINIC | Age: 73
End: 2018-01-31

## 2018-01-31 DIAGNOSIS — C34.31 MALIGNANT NEOPLASM OF LOWER LOBE OF RIGHT LUNG (HCC): Primary | ICD-10-CM

## 2018-01-31 DIAGNOSIS — R91.1 LESION OF LUNG: Primary | ICD-10-CM

## 2018-02-06 ENCOUNTER — TELEPHONE (OUTPATIENT)
Dept: HEMATOLOGY ONCOLOGY | Facility: CLINIC | Age: 73
End: 2018-02-06

## 2018-02-06 NOTE — TELEPHONE ENCOUNTER
Pt contacted [facility] by phone on 2/5/2018  Pt is known to [facility] from her previous treatment  Pt discussed her current treatment plan, which included a pet scan, and a rad onc consult  Pt expressed gratitude that her cancer was found, and fears about the effectiveness of additional treatment  Pt discussed oncology psychological distress related to her history of "PTSD" and "OCD " Pt admits her psychological symtoms have "gotten worse," since dealing with changes with her cancer treatment plan  Pt denied she is connected to a mental healthcare treatment  The reason pt named for not receiving treatment is that she is "too busy," and fears about the cost of treatment  Pt also expressed financial concerns related to her cancer treatment  LSW provided pt supportive counseling and discussed the benefits of mental health treatment to address her psychological disorders  LSW also discussed financial resources available to cancer patients  Pt denied interest in scheduleing a follow up with [facility]  Pt stated she will contact [facility] directly for additional assistance

## 2018-02-09 PROBLEM — C34.31 CANCER OF LOWER LOBE OF RIGHT LUNG (HCC): Status: ACTIVE | Noted: 2018-02-09

## 2018-02-09 RX ORDER — OMEPRAZOLE 40 MG/1
1 CAPSULE, DELAYED RELEASE ORAL DAILY
COMMUNITY
Start: 2015-10-20

## 2018-02-12 ENCOUNTER — RADIATION ONCOLOGY CONSULT (OUTPATIENT)
Dept: RADIATION ONCOLOGY | Facility: HOSPITAL | Age: 73
End: 2018-02-12

## 2018-02-12 ENCOUNTER — APPOINTMENT (OUTPATIENT)
Dept: RADIATION ONCOLOGY | Facility: HOSPITAL | Age: 73
End: 2018-02-12
Attending: RADIOLOGY
Payer: COMMERCIAL

## 2018-02-12 DIAGNOSIS — C34.31 CANCER OF LOWER LOBE OF RIGHT LUNG (HCC): Primary | ICD-10-CM

## 2018-02-12 PROCEDURE — 99215 OFFICE O/P EST HI 40 MIN: CPT | Performed by: RADIOLOGY

## 2018-02-12 PROCEDURE — G0463 HOSPITAL OUTPT CLINIC VISIT: HCPCS | Performed by: RADIOLOGY

## 2018-02-12 NOTE — PROGRESS NOTES
Darien Hanson  1945  Ms Veronica Godfrey is a 67 y o  female  10/20/17 CT chest  IMPRESSION:     Slight interval enlargement of a 7 mm right lower lobe nodule, previously 4 mm   Possibility of a developing primary malignancy or metastasis cannot be excluded   Mild COPD   Consider thoracic surgery consultation   At minimum, 3 month follow-up CT is recommended      No pathologic adenopathy      1/18/18 PET  IMPRESSION:  1   Increased size and hypermetabolism of right lower lung nodule, most concerning for malignancy  2   Small mildly hypermetabolic right axillary node, possibly just reactive or related to the right upper extremity injection   This may be reassessed on follow-up exam to exclude progression  3   Stable distribution of mild hilar and mediastinal activity without new hypermetabolic adenopathy  4   Resolved left upper cervical hypermetabolic adenopathy   Stable focus of activity in the right upper neck  5   Resolved hypermetabolic soft tissue along the left paraspinal thoracic spine  Chief Complaint   Patient presents with    Consult     Lung Cancer       No matching staging information was found for the patient      Oncology History             Lymphoma ([address] )    2009 Initial Diagnosis     Lymphoma ([address] )         9/2010 -  Chemotherapy     rituximab x4         2012 -  Chemotherapy     Treanda/rituximab x 4 cycles         9/20/2013 - 10/7/2013 Radiation     Right inguinal node to 2400cGy           Cancer of upper lobe of right lung ([address] )    12/30/2015 -  Radiation     SBRT right upper lung         2015 Initial Diagnosis     Cancer of upper lobe of right lung ([address] )           Cancer of lower lobe of right lung ([address] )    2/9/2018 Initial Diagnosis     Cancer of lower lobe of right lung [facility])            Health Maintenance   Topic Date Due    Hepatitis C Screening  1945    [facility] PLAN OF CARE  1945    COLONOSCOPY  1945    PNEUMOCOCCAL POLYSACCHARIDE VACCINE AGE 65 AND OVER  10/24/2010    GLAUCOMA SCREENING 67+ YR  10/24/2012    DTaP,Tdap,and Td Vaccines (2 - Td) 05/25/2015    INFLUENZA VACCINE  09/01/2017       Patient Active Problem List   Diagnosis    Lymphoma ([facility] )    Incidental lung nodule, greater than or equal to 8mm    [facility]    Cancer of lower lobe of right lung (HCC)     Past Medical History:   Diagnosis Date    Chronic pain     GERD (gastroesophageal reflux disease)     Lymphoma (HCC)     OCD (obsessive compulsive disorder)     Psychiatric disorder      Past Surgical History:   Procedure Laterality Date    APPENDECTOMY      CHOLECYSTECTOMY      LUNG BIOPSY      THYROIDECTOMY, PARTIAL       Family History   Problem Relation Age of Onset    Diabetes Mother     Heart disease Mother     Heart disease Brother     Cancer Maternal Grandfather      Social History     Social History    Marital status:      Spouse name: N/A    Number of children: N/A    Years of education: N/A     Occupational History    Not on file  Social History Main Topics    Smoking status: Current Every Day Smoker     Types: Cigarettes    Smokeless tobacco: Never Used    Alcohol use No    Drug use: No    Sexual activity: Not on file     Other Topics Concern    Not on file     Social History Narrative    No narrative on file       Current Outpatient Prescriptions:     omeprazole (PriLOSEC) 40 MG capsule, Take 1 capsule by mouth daily, Disp: , Rfl:     Allergies   Allergen Reactions    Flagyl [Metronidazole]      Other reaction(s): lips swollen/numb nose    Moxifloxacin     Other      Grass, trees    Penicillins Hives     Other reaction(s): Hives     Review of Systems:  Review of Systems   Constitutional: Positive for fatigue  HENT: Negative  Eyes: Positive for pain (Right eye cyst) and visual disturbance (Right eye)  Respiratory: Positive for cough (Productive )  Cardiovascular: Positive for palpitations  Gastrointestinal: Negative  Endocrine: Positive for cold intolerance and heat intolerance  Genitourinary: Negative  Musculoskeletal: Positive for arthralgias (Hands), back pain and neck pain  Skin: Negative  Allergic/Immunologic: Negative  Neurological: Positive for light-headedness  Negative for dizziness and headaches  Hematological: Negative  Psychiatric/Behavioral: Positive for sleep disturbance  The patient is nervous/anxious  Review of Systems    Vitals:     02/12/18 1425   BP: 90/54   BP Location: Left arm   Patient Position: Sitting   Cuff Size: Standard   Pulse: 77   Resp: 16   Temp: 97 9 °F (36 6 °C)   TempSrc: Oral   Weight: 45 2 kg (99 lb 9 6 oz)   Height: 4' 11" (1 499 m)         Imaging:Nm Pet Ct Skull Base To Mid Thigh    Result Date: 1/18/2018  Narrative: PET/CT SCAN INDICATION: R91 1: Solitary pulmonary nodule B69 50: Follicular lymphoma, unspecified, unspecified site D72 44: Follicular lymphoma, unspecified, lymph nodes of head, face, and neck  History taken directly from the electronic ordering system  Abnormal CT, increasing right lower lung nodule, restaging for treatment management MODIFIER:     PS COMPARISON: CT 10/20/2017 and priors, including PET CT 2/52/4361 CELL TYPE:  Follicular lymphoma, left cervical node biopsy 3/16/2010 TECHNIQUE:   8 1 mCi F-18-FDG administered IV  Multiplanar attenuation corrected and non attenuation corrected PET images are available for interpretation, and contiguous, low dose, axial CT sections were obtained from the vertex through the femurs following the administration of oral contrast material (7 5 cc Omnipaque-240 in 300 cc water)  Intravenous contrast material was not utilized  This examination, like all CT scans performed in the [facility], was performed utilizing techniques to minimize radiation dose exposure, including the use of iterative reconstruction and automated exposure control   Fasting serum glucose: 95 mg/dl FINDINGS: VISUALIZED BRAIN:   No acute abnormalities are seen  HEAD/NECK:   Resolved left upper cervical hypermetabolic adenopathy  Stable mild right upper cervical hypermetabolic focus, SUV 3 1, prior study 3 4  No new FDG avid cervical adenopathy is seen  CT images: Otherwise stable  CHEST:   Stable mildly hypermetabolic right upper anterior pleural parenchymal densities, SUV 1 3, prior study 1 3, may represent posttreatment inflammatory changes  9 x 8 mm right lower lung nodule demonstrates increased hypermetabolism, SUV 3 3  Prior measurement 4 mm, SUV 0 9  This is most compatible with malignancy, possibly a primary lung malignancy  Resolved hypermetabolic soft tissue along the left lower paraspinal thoracic spine, SUV measuring 1 in this region, appearing similar to background soft tissue activity, prior SUV 2 8  Unchanged scattered areas of hilar and mediastinal FDG activity, SUV measuring up to 2 6, prior SUV 2 6  No new hypermetabolic lesions in this region  Small subcentimeter right axillary node with minimal FDG activity, SUV 1 4, may be reactive or related to the right upper extremity injection  This may be reassessed on follow-up exam  CT images: Emphysema  Coronary artery calcifications  ABDOMEN:   Somewhat nodular focus of activity in the right upper quadrant of the abdomen, SUV 4 2, may be related to the duodenum  This may be reassessed on follow-up exam   Diffuse gastric activity is again noted, likely physiologic  No additional FDG avid soft tissue lesions are seen  CT images: Stable  PELVIS: No FDG avid soft tissue lesions are seen  CT images: Stable  OSSEOUS STRUCTURES: No FDG avid lesions are seen  CT images: Spine degenerative change and scoliosis  Degenerative change in the hands  Impression: 1  Increased size and hypermetabolism of right lower lung nodule, most concerning for malignancy   2   Small mildly hypermetabolic right axillary node, possibly just reactive or related to the right upper extremity injection  This may be reassessed on follow-up exam to exclude progression  3   Stable distribution of mild hilar and mediastinal activity without new hypermetabolic adenopathy  4   Resolved left upper cervical hypermetabolic adenopathy  Stable focus of activity in the right upper neck  5   Resolved hypermetabolic soft tissue along the left paraspinal thoracic spine      Workstation performed: [id number]       Teaching SBRT lung

## 2018-02-12 NOTE — PROGRESS NOTES
Consultation - Radiation Oncology     WBF:[id number] : 1945  Encounter: [id number]  Patient Information: Evie Lance    Reason for Consult / Principal Problem:   Chief Complaint   Patient presents with    Consult     Lung Cancer            HPI: Evie Lance is a 67y o  year old female with 42+ pack year smoking history and past medical history of stage IV follicular lymphoma currently in remission, stage I lung carcinoma of the right upper lobe in , who now presents with clinical right lower lobe lesion consistent with early stage lung carcinoma  Briefly, the patient was diagnosed with stage IV follicular lymphoma in  and placed on observation until  when she became symptomatic  She received 4 doses of rituximab with significant response  She underwent consolidative Treanda/rituximab until 2013 for persistent right inguinal lymph node enlargement  Ultimately, she underwent right inguinal lymph node radiation to a dose of 2400 cGy ending in 2013  She remains clinically LYLY from lymphoma at this time  In  the patient was found with new right upper lobe lung nodule on serial CAT scans performed for lymphoma surveillance under Dr Olmos [place] care  Follow-up PET/CT demonstrated FDG avidity and enlargement  CT-guided needle lung biopsy on 2015 showed hyperplasia, but no definite malignancy  Her cellblock revealed atypical cellular changes and could not exclude a neoplasm  She suffered a pneumothorax after the biopsy which required admission to the hospital and chest tube placement  She declined resection and ultimately underwent SBRT in 2015, which she tolerated well  There is no evidence of recurrence of the right upper lobe lung carcinoma at this time  Follow-up chest CT from 10/20/2017 demonstrated slight interval enlargement of a 7 mm right lower lobe nodule, previously 4 mm    Follow-up PET-CT on 2018 demonstrated increased size and hypermetabolic in the right lower lobe lung nodule, now measuring 9 mm with an SUV of 3 3 (previous PET 4/2017 SUV of 0 9)  Findings  Her most concerning for malignancy  Small mildly hypermetabolic right axillary node thought to  Be reactive or related to injection  Follow-up was recommended  Stable distribution of mild hilar and mediastinal activity without new hypermetabolic adenopathy  Currently, the patient denies any respiratory symptoms  She is a current smoker and has episodic cough productive of clear sputum  She denies any persistent cough  She denies any fevers  She denies any hemoptysis  She denies any shortness of breath at rest    She denies weight loss  The patient has a history of psychiatric disorder including anxiety and OCD  She states that her anxiety resulting shortness of breath  The patient complains of lower back pain and hip pain related to scoliosis as well as traumatic back injury in October 2017  She does not take pain medication  She has discussed this with her PCP, but has not sought further treatment at this time  She is considering follow-up at spine clinic  Pain has been stable, but she is concerned about lying flat on her back for prolonged periods of time  She  Also complains of neuropathic pains in the right shoulder  This has been stable for approximately 4 years after about of varicella zoster this area  She has been treated with Lyrica in the past, but states that she cannot tolerate side effects  She has not sought further attention with her PCP at this time  There are no new areas of pain  She continues to smoke about half a pack of cigarettes per day  Review of Systems:  Review of Systems   Constitutional: Negative  HENT: Negative  Respiratory: Positive for cough  Negative for shortness of breath, wheezing and stridor  Cardiovascular: Negative  Gastrointestinal: Negative  Endocrine: Negative  Genitourinary: Negative  Musculoskeletal: Positive for arthralgias, back pain (as per HPI) and neck pain (as per HPI)  Skin: Negative  Allergic/Immunologic: Negative  Neurological: Positive for light-headedness and headaches  Negative for speech difficulty  Neuropathy of the right shoulder as per HPI  Periodic lightheadedness   Hematological: Negative  Psychiatric/Behavioral: The patient is nervous/anxious           OCD disorder       ECO - Symptomatic but completely ambulatory    Oncology History             Lymphoma ([address] [address] )     Initial Diagnosis     Lymphoma ([address] [address] )         2010 -  Chemotherapy     rituximab x4          -  Chemotherapy     Treanda/rituximab x 4 cycles         2013 - 10/7/2013 Radiation     Right inguinal node to 2400cGy           Cancer of upper lobe of right lung ([address] )    2015 -  Radiation     SBRT right upper lung          Initial Diagnosis     Cancer of upper lobe of right lung ([address] )           Cancer of lower lobe of right lung ([address] )    2018 Initial Diagnosis     Cancer of lower lobe of right lung [facility])         Oncology History             Lymphoma ([address] [address] )     Initial Diagnosis     Lymphoma ([address] )         2010 -  Chemotherapy     rituximab x4          -  Chemotherapy     Treanda/rituximab x 4 cycles         2013 - 10/7/2013 Radiation     Right inguinal node to 2400cGy           Cancer of upper lobe of right lung ([address] )    2015 -  Radiation     SBRT right upper lung          Initial Diagnosis     Cancer of upper lobe of right lung ([address] )           Cancer of lower lobe of right lung ([address] )    2018 Initial Diagnosis     Cancer of lower lobe of right lung ([address] )            Historical Information   Past Medical History:   Diagnosis Date    Chronic pain     GERD (gastroesophageal reflux disease)     Lymphoma (HCC)     OCD (obsessive compulsive disorder)     Psychiatric disorder      Past Surgical History:   Procedure Laterality Date    APPENDECTOMY  CHOLECYSTECTOMY      LUNG BIOPSY      THYROIDECTOMY, PARTIAL       Family History   Problem Relation Age of Onset    Diabetes Mother     Heart disease Mother     Heart disease Brother     Cancer Maternal Grandfather      Social History   History   Alcohol Use No     History   Drug Use No     History   Smoking Status    Current Every Day Smoker    Types: Cigarettes   Smokeless Tobacco    Never Used     Meds/Allergies   Allergies   Allergen Reactions    Flagyl [Metronidazole]      Other reaction(s): lips swollen/numb nose    Moxifloxacin     Other      Grass, trees    Penicillins Hives     Other reaction(s): Hives       Current Meds:     Current Outpatient Prescriptions:     omeprazole (PriLOSEC) 40 MG capsule, Take 1 capsule by mouth daily, Disp: , Rfl:       OBJECTIVE:   There were no vitals taken for this visit  Physical Exam:   Physical Exam   Constitutional: She is oriented to person, place, and time  She appears well-developed and well-nourished  No distress  Eyes: Conjunctivae are normal  No scleral icterus  Cardiovascular: Normal rate, regular rhythm and normal heart sounds  No murmur heard  Pulmonary/Chest: Effort normal  No respiratory distress  She has no wheezes  She has no rales  Decreased breath sounds throughout   Abdominal: Soft  She exhibits no distension  There is no tenderness  Musculoskeletal: She exhibits no edema  Lymphadenopathy:     She has no cervical adenopathy  Neurological: She is alert and oriented to person, place, and time  Skin: Skin is warm and dry  Nursing note and vitals reviewed  Imaging Studies:  Nm Pet Ct Skull Base To Mid Thigh    Result Date: 1/18/2018  Narrative: PET/CT SCAN INDICATION: R91 1: Solitary pulmonary nodule P46 09: Follicular lymphoma, unspecified, unspecified site I22 77: Follicular lymphoma, unspecified, lymph nodes of head, face, and neck  History taken directly from the electronic ordering system   Abnormal CT, increasing right lower lung nodule, restaging for treatment management MODIFIER:     PS COMPARISON: CT 10/20/2017 and priors, including PET CT 2/87/6093 CELL TYPE:  Follicular lymphoma, left cervical node biopsy 3/16/2010 TECHNIQUE:   8 1 mCi F-18-FDG administered IV  Multiplanar attenuation corrected and non attenuation corrected PET images are available for interpretation, and contiguous, low dose, axial CT sections were obtained from the vertex through the femurs following the administration of oral contrast material (7 5 cc Omnipaque-240 in 300 cc water)  Intravenous contrast material was not utilized  This examination, like all CT scans performed in the [facility], was performed utilizing techniques to minimize radiation dose exposure, including the use of iterative reconstruction and automated exposure control  Fasting serum glucose: 95 mg/dl FINDINGS: VISUALIZED BRAIN:   No acute abnormalities are seen  HEAD/NECK:   Resolved left upper cervical hypermetabolic adenopathy  Stable mild right upper cervical hypermetabolic focus, SUV 3 1, prior study 3 4  No new FDG avid cervical adenopathy is seen  CT images: Otherwise stable  CHEST:   Stable mildly hypermetabolic right upper anterior pleural parenchymal densities, SUV 1 3, prior study 1 3, may represent posttreatment inflammatory changes  9 x 8 mm right lower lung nodule demonstrates increased hypermetabolism, SUV 3 3  Prior measurement 4 mm, SUV 0 9  This is most compatible with malignancy, possibly a primary lung malignancy  Resolved hypermetabolic soft tissue along the left lower paraspinal thoracic spine, SUV measuring 1 in this region, appearing similar to background soft tissue activity, prior SUV 2 8  Unchanged scattered areas of hilar and mediastinal FDG activity, SUV measuring up to 2 6, prior SUV 2 6  No new hypermetabolic lesions in this region   Small subcentimeter right axillary node with minimal FDG activity, SUV 1 4, may be reactive or related to the right upper extremity injection  This may be reassessed on follow-up exam  CT images: Emphysema  Coronary artery calcifications  ABDOMEN:   Somewhat nodular focus of activity in the right upper quadrant of the abdomen, SUV 4 2, may be related to the duodenum  This may be reassessed on follow-up exam   Diffuse gastric activity is again noted, likely physiologic  No additional FDG avid soft tissue lesions are seen  CT images: Stable  PELVIS: No FDG avid soft tissue lesions are seen  CT images: Stable  OSSEOUS STRUCTURES: No FDG avid lesions are seen  CT images: Spine degenerative change and scoliosis  Degenerative change in the hands  Impression: 1  Increased size and hypermetabolism of right lower lung nodule, most concerning for malignancy  2   Small mildly hypermetabolic right axillary node, possibly just reactive or related to the right upper extremity injection  This may be reassessed on follow-up exam to exclude progression  3   Stable distribution of mild hilar and mediastinal activity without new hypermetabolic adenopathy  4   Resolved left upper cervical hypermetabolic adenopathy  Stable focus of activity in the right upper neck  5   Resolved hypermetabolic soft tissue along the left paraspinal thoracic spine  Workstation performed: [id number]       Labs:  Recent Results (from the past 672 hour(s))   Fingerstick Glucose (POCT)    Collection Time: 01/18/18  1:01 PM   Result Value Ref Range    POC Glucose 95 65 - 140 mg/dl       Assessment:  1   Cancer of lower lobe of right lung [facility])  Radiation Simulation Treatment     Plan:  Orders Placed This Encounter   Procedures    Radiation Simulation Treatment          Discussions/Summary:  Kavni Toussaint is a  51-year-old woman and current smoker with past medical history significant for stage IV follicular lymphoma and stage I right upper lobe lung carcinoma both currently LYLY who now presents with progressively enlarging right lower lobe nodule with increased FDG avidity  She has a history of pneumothorax, there are no plans for invasive needle biopsy  Given her clinical presentation, this is most likely second stage I lung carcinoma  I agree with recommendation to pursue SBRT to a total dose of 50Gy in 5 fractions for treatment  I feel that this would offer the patient significant benefit in terms of local regional control and potential cure  She has tolerated this procedure well in the past   Location of the new lesion will now for 2nd course of radiation  The rationale and potential benefits, as well as the risks and acute and late side effects and potential toxicities of radiation were discussed with the patient at length  Side effects discussed included, but were not limited to: Fatigue, skin erythema, fibrosis, pneumonitis, decrease in pulmonary function, nerve injury, chest wall injury  The patient understands that although presentation is consistent with stage I lung carcinoma, without pathologic diagnosis the possibility of over treatment or inappropriate treatment exists  She was given the opportunity to ask questions and all questions were answered to her satisfaction  She wished to proceed with SBRT treatment  The patient was able to lie flat on examination, therefore I recommended that we proceed with radiation planning  She agreed, but did not wish to make her appointment today  She also requested that consent be done at time of simulation, which is related to her OCD  We will contact her to schedule 4DCT simulation at [facility]  I have recommended she follow-up with her PCP regarding her back and neck pain in the interim  It appears her PCP is aware  We discussed her neuropathy symptoms in the patient states that she does not wish to have further medication treatment for symptoms  She is not ready to quit smoking at this time       We are available for any questions or concerns that may arise in the interim  Peggy Levy MD  6/94/8153,0:92 PM    Portions of the record may have been created with voice recognition software   Occasional wrong word or "sound a like" substitutions may have occurred due to the inherent limitations of voice recognition software   Read the chart carefully and recognize, using context, where substitutions have occurred

## 2018-02-14 ENCOUNTER — OFFICE VISIT (OUTPATIENT)
Dept: FAMILY MEDICINE CLINIC | Facility: CLINIC | Age: 73
End: 2018-02-14
Payer: COMMERCIAL

## 2018-02-14 ENCOUNTER — TELEPHONE (OUTPATIENT)
Dept: FAMILY MEDICINE CLINIC | Facility: CLINIC | Age: 73
End: 2018-02-14

## 2018-02-14 VITALS
WEIGHT: 100 LBS | OXYGEN SATURATION: 99 % | HEART RATE: 68 BPM | DIASTOLIC BLOOD PRESSURE: 68 MMHG | TEMPERATURE: 98 F | SYSTOLIC BLOOD PRESSURE: 106 MMHG | RESPIRATION RATE: 18 BRPM | BODY MASS INDEX: 20.16 KG/M2 | HEIGHT: 59 IN

## 2018-02-14 DIAGNOSIS — C82.80 OTHER TYPE OF FOLLICULAR LYMPHOMA, UNSPECIFIED BODY REGION (HCC): ICD-10-CM

## 2018-02-14 DIAGNOSIS — M25.552 BILATERAL HIP PAIN: Primary | ICD-10-CM

## 2018-02-14 DIAGNOSIS — R91.1 INCIDENTAL LUNG NODULE, GREATER THAN OR EQUAL TO 8MM: ICD-10-CM

## 2018-02-14 DIAGNOSIS — R25.2 MUSCLE CRAMPS: ICD-10-CM

## 2018-02-14 DIAGNOSIS — G89.29 CHRONIC BILATERAL LOW BACK PAIN WITHOUT SCIATICA: ICD-10-CM

## 2018-02-14 DIAGNOSIS — M25.551 BILATERAL HIP PAIN: Primary | ICD-10-CM

## 2018-02-14 DIAGNOSIS — M54.50 CHRONIC BILATERAL LOW BACK PAIN WITHOUT SCIATICA: ICD-10-CM

## 2018-02-14 DIAGNOSIS — F41.9 ANXIETY: ICD-10-CM

## 2018-02-14 DIAGNOSIS — R53.83 FATIGUE, UNSPECIFIED TYPE: ICD-10-CM

## 2018-02-14 DIAGNOSIS — F43.10 PTSD (POST-TRAUMATIC STRESS DISORDER): ICD-10-CM

## 2018-02-14 DIAGNOSIS — F17.200 TOBACCO USE DISORDER: ICD-10-CM

## 2018-02-14 PROBLEM — D22.9 CHANGE IN MOLE: Status: ACTIVE | Noted: 2017-12-26

## 2018-02-14 PROCEDURE — 99214 OFFICE O/P EST MOD 30 MIN: CPT | Performed by: NURSE PRACTITIONER

## 2018-02-14 NOTE — PATIENT INSTRUCTIONS
Lung nodule- suspicious for malignancy  Lymphoma- followed by Dr Sadie Bravo  Back pain/hip pain- request spinal xrays  Due to known malignancy, check bone scan  Anxiety- aggravated  Muscle cramps- check CK level  We will call you with result  Fatigue- cannot take b complex  Check B12 level  Chronic Fatigue Syndrome   WHAT YOU NEED TO KNOW:   What is chronic fatigue syndrome? Chronic fatigue syndrome (CFS) is a long-term illness that causes severe physical and mental exhaustion  The extreme tiredness may make it hard or impossible to do daily tasks  What causes CFS? The cause of CFS is not known  It may run in families  It may be caused by problems with your immune system  The symptoms likely have more than one trigger  You may have your first symptoms after you have had an infection, such as a cold or the flu  Your symptoms may begin after a very stressful time in your life  What are the signs and symptoms of CFS? Symptoms of CFS may change from day to day and can range from mild to severe  CFS can start suddenly, or the symptoms may come on slowly  The symptoms may come and go, or last for months to years  You may have any of the following:  · Problems remembering or concentrating    · Sore throat    · Swollen or tender lymph nodes    · Muscle or joint pain    · Headaches    · Fatigue that does not go away after you rest    · Fatigue that lasts longer than 24 hours after exercise or strenuous activity  How is CFS diagnosed? Your healthcare provider will do a physical exam  Getachew Lopez may need blood tests or other tests to diagnose CFS  You must have certain symptoms for at least 6 months for your healthcare provider to diagnose CFS  How can I help manage my symptoms? · Exercise regularly as directed  Ask your healthcare provider about the best exercise plan for you  You may need to start with low intensity exercise for short periods of time   Exercise can help decrease your symptoms and help you have more energy  You may be able to increase your exercise as you get stronger  Start slowly and gradually increase activity so your symptoms do not get worse  · Rest as needed  Take naps and change your schedule to fit your energy level  You may need to take 5 to 10 minute rest periods every hour or more  Try to go to bed and wake up at the same times every day  · Learn new ways to relax , such as deep breathing, meditation, or muscle relaxation  Ask for more information about relaxation methods that may be right for you  · Care for your sore joints or muscles  Apply heat for 20 to 30 minutes every 2 hours for as many days as directed  Ice may also help  You can alternate heat and ice  Apply ice for 15 to 20 minutes every hour or as directed  Use an ice pack, or put crushed ice in a plastic bag  Cover it with a towel before you place it on your skin  · Keep a record of your activities and symptoms each day  This record will help you learn when you have the most energy  You will also be able to follow your progress  Bring this record with you to your follow-up visits  · Get support  Talk to your healthcare provider, counselor, family, or friends about your feelings  Cognitive behavior therapy can teach you how to manage changes in your relationships and lifestyle  Family therapy can help you and your family members manage the stress of CFS  · Medicines  may be given to help decrease your symptoms, such as joint pain, depression, anxiety, or fever  You may be more sensitive to medicines  Talk to your healthcare provider before you take any vitamins, herbs, or over-the-counter medicines  Call 911 if  you think about hurting yourself or someone else  When should I contact my healthcare provider? · You have a fever  · You have new symptoms that you are worried about  · You have questions or concerns about your condition or care  CARE AGREEMENT:   You have the right to help plan your care  Learn about your health condition and how it may be treated  Discuss treatment options with your caregivers to decide what care you want to receive  You always have the right to refuse treatment  The above information is an  only  It is not intended as medical advice for individual conditions or treatments  Talk to your doctor, nurse or pharmacist before following any medical regimen to see if it is safe and effective for you  © 2017 [employer] Information is for End User's use only and may not be sold, redistributed or otherwise used for commercial purposes  All illustrations and images included in CareNotes® are the copyrighted property of A D A M [employer]  or [employer]fin Rhodes

## 2018-02-14 NOTE — TELEPHONE ENCOUNTER
Please call pt re hip xrays,xray spine confused as to which to have done & in what order    Please call & clear confusion for pt,call [phone] cell or home [phone]
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
## 2018-02-15 NOTE — TELEPHONE ENCOUNTER
Pt requested xrays of hips and total spine  I want bone scan because of her pain and new malignancy  Have her schedule bone scan first,l then get xrays   If bone scan is denied for approvall, just do xrays

## 2018-02-16 ENCOUNTER — APPOINTMENT (OUTPATIENT)
Dept: RADIOLOGY | Facility: MEDICAL CENTER | Age: 73
End: 2018-02-16
Payer: COMMERCIAL

## 2018-02-16 ENCOUNTER — DOCUMENTATION (OUTPATIENT)
Dept: RADIATION ONCOLOGY | Facility: HOSPITAL | Age: 73
End: 2018-02-16

## 2018-02-16 DIAGNOSIS — M25.551 BILATERAL HIP PAIN: ICD-10-CM

## 2018-02-16 DIAGNOSIS — F17.200 TOBACCO USE DISORDER: ICD-10-CM

## 2018-02-16 DIAGNOSIS — G89.29 CHRONIC BILATERAL LOW BACK PAIN WITHOUT SCIATICA: ICD-10-CM

## 2018-02-16 DIAGNOSIS — M25.552 BILATERAL HIP PAIN: ICD-10-CM

## 2018-02-16 DIAGNOSIS — C82.80 OTHER TYPE OF FOLLICULAR LYMPHOMA, UNSPECIFIED BODY REGION (HCC): ICD-10-CM

## 2018-02-16 DIAGNOSIS — M54.50 CHRONIC BILATERAL LOW BACK PAIN WITHOUT SCIATICA: ICD-10-CM

## 2018-02-16 DIAGNOSIS — R91.1 INCIDENTAL LUNG NODULE, GREATER THAN OR EQUAL TO 8MM: ICD-10-CM

## 2018-02-16 PROCEDURE — 73502 X-RAY EXAM HIP UNI 2-3 VIEWS: CPT

## 2018-02-16 PROCEDURE — 72082 X-RAY EXAM ENTIRE SPI 2/3 VW: CPT

## 2018-02-16 NOTE — PROGRESS NOTES
Pt contacted LSW on 2/16/2018 for emotional support and update  Pt reports she is scheduled for xray and bone scan and is glad to have plan to address her back pain  Pt discussed beginning radiation at the [facility] on 2/27/2018 as she prefer to see Dr Ursula Tracy  Pt expressed hopefulness about her upcoming cancer treatment  Pt discussed ongoing relationship challenges with her family resulting in limited social support  LSW provided pt emotional support and encouraged pt to follow up as desired for cancer care counseling

## 2018-02-19 LAB
CK MB SERPL-MCNC: 3.7 NG/ML (ref 0–5.3)
CK SERPL-CCNC: 96 U/L (ref 24–173)
VIT B12 SERPL-MCNC: 598 PG/ML (ref 232–1245)

## 2018-02-22 ENCOUNTER — TELEPHONE (OUTPATIENT)
Dept: FAMILY MEDICINE CLINIC | Facility: CLINIC | Age: 73
End: 2018-02-22

## 2018-02-22 DIAGNOSIS — M41.9 SCOLIOSIS, UNSPECIFIED SCOLIOSIS TYPE, UNSPECIFIED SPINAL REGION: Primary | ICD-10-CM

## 2018-02-22 NOTE — TELEPHONE ENCOUNTER
Ninoska from [facility] radiology called   Faye Hammans had a xray done and there is significant findings on it  They wanted you to be aware that it is in epic to please review   Any questions call them [phone]

## 2018-02-23 NOTE — TELEPHONE ENCOUNTER
Doesn't look like anything that needs to be addressed immediately  This patient was seen by Greg Valerio

## 2018-02-27 ENCOUNTER — APPOINTMENT (OUTPATIENT)
Dept: RADIATION ONCOLOGY | Facility: HOSPITAL | Age: 73
End: 2018-02-27
Attending: RADIOLOGY
Payer: COMMERCIAL

## 2018-02-27 PROCEDURE — 77370 RADIATION PHYSICS CONSULT: CPT | Performed by: RADIOLOGY

## 2018-02-27 PROCEDURE — 77334 RADIATION TREATMENT AID(S): CPT | Performed by: RADIOLOGY

## 2018-02-27 PROCEDURE — 77470 SPECIAL RADIATION TREATMENT: CPT | Performed by: RADIOLOGY

## 2018-03-01 ENCOUNTER — APPOINTMENT (OUTPATIENT)
Dept: RADIATION ONCOLOGY | Facility: HOSPITAL | Age: 73
End: 2018-03-01
Attending: RADIOLOGY
Payer: COMMERCIAL

## 2018-03-12 PROCEDURE — 77334 RADIATION TREATMENT AID(S): CPT | Performed by: RADIOLOGY

## 2018-03-12 PROCEDURE — 77295 3-D RADIOTHERAPY PLAN: CPT | Performed by: RADIOLOGY

## 2018-03-12 PROCEDURE — 77293 RESPIRATOR MOTION MGMT SIMUL: CPT | Performed by: RADIOLOGY

## 2018-03-12 PROCEDURE — 77300 RADIATION THERAPY DOSE PLAN: CPT | Performed by: RADIOLOGY

## 2018-03-19 DIAGNOSIS — C34.31 MALIGNANT NEOPLASM OF LOWER LOBE BRONCHUS, RIGHT (HCC): Primary | ICD-10-CM

## 2018-03-20 PROCEDURE — 77373 STRTCTC BDY RAD THER TX DLVR: CPT | Performed by: RADIOLOGY

## 2018-03-20 NOTE — PROGRESS NOTES
Labs have been reviewed and are wnl  Follow up as scheduled  B12 is around 600 (normal is 200-1200)  Pt was requesting B12 injection  She can come in for a nurse visit for this   I needed to be sure it wasn't too high

## 2018-03-22 PROCEDURE — 77373 STRTCTC BDY RAD THER TX DLVR: CPT | Performed by: RADIOLOGY

## 2018-03-26 PROCEDURE — 77373 STRTCTC BDY RAD THER TX DLVR: CPT | Performed by: RADIOLOGY

## 2018-03-28 ENCOUNTER — OFFICE VISIT (OUTPATIENT)
Dept: FAMILY MEDICINE CLINIC | Facility: CLINIC | Age: 73
End: 2018-03-28
Payer: COMMERCIAL

## 2018-03-28 VITALS
DIASTOLIC BLOOD PRESSURE: 62 MMHG | RESPIRATION RATE: 18 BRPM | BODY MASS INDEX: 19.83 KG/M2 | OXYGEN SATURATION: 94 % | HEIGHT: 59 IN | SYSTOLIC BLOOD PRESSURE: 110 MMHG | WEIGHT: 98.38 LBS | HEART RATE: 76 BPM | TEMPERATURE: 97.4 F

## 2018-03-28 DIAGNOSIS — M79.674 TOE PAIN, RIGHT: ICD-10-CM

## 2018-03-28 DIAGNOSIS — I73.9 VASCULAR DISEASE, PERIPHERAL (HCC): Primary | ICD-10-CM

## 2018-03-28 PROCEDURE — 99214 OFFICE O/P EST MOD 30 MIN: CPT | Performed by: NURSE PRACTITIONER

## 2018-03-28 PROCEDURE — 77373 STRTCTC BDY RAD THER TX DLVR: CPT | Performed by: RADIOLOGY

## 2018-03-28 NOTE — PATIENT INSTRUCTIONS
Toe pain- I have concerns regarding pt circulation based upon the appearance and color of the skin of her toe  I have advised that she Vascular surgery for further evaluation  Pt also counseled on obtaining improved footwear      Anxiety- I am unable to refer for medical marijuana therapy

## 2018-03-28 NOTE — PROGRESS NOTES
Assessment/Plan:    No problem-specific Assessment & Plan notes found for this encounter  Diagnoses and all orders for this visit:    Vascular disease, peripheral ([facility] )  -     Ambulatory referral to Vascular Surgery; Future    Toe pain, right  -     Ambulatory referral to Vascular Surgery; Future          Subjective:      Patient ID: Nestor Wiseman is a 67 y o  female  Pt is here to talk about soreness in between her toes  She applied Mycology cream, but it isnt helping  The 5th toe on the right foot is red and painful  She has redness in between the toes  Pt is interested in medical marijuana  Her anxiety is high  She is shaky and she is short of breath  She states that she canot take pills because they "dont agree with me"  She tried counseling and anxiety meds but they didn't help  The following portions of the patient's history were reviewed and updated as appropriate:   She  has a past medical history of Chronic pain; GERD (gastroesophageal reflux disease); Lymphoma ([place] [address] ); OCD (obsessive compulsive disorder); and Psychiatric disorder    She   Patient Active Problem List    Diagnosis Date Noted    Vascular disease, peripheral ([place] [address] ) 03/28/2018    Toe pain, right 03/28/2018    Cancer of lower lobe of right lung ([facility] ) 02/09/2018    Lymphoma ([place] [address] ) 01/29/2018    Incidental lung nodule, greater than or equal to 8mm 01/29/2018    Bilateral hip pain 12/26/2017    Change in mole 12/26/2017    Chronic lumbar pain 12/26/2017    Cancer of upper lobe of right lung ([place] [address] ) 07/27/2016    PTSD (post-traumatic stress disorder) 07/22/2016    Atherosclerosis of native artery of extremity ([facility] ) 05/11/2016    Nicotine dependence 05/11/2016    COPD exacerbation ([place] [address] ) 02/23/2016    Lung nodule 04/15/2015    Inguinal pain 03/09/2015    Tobacco use disorder 09/23/2014    Anxiety 08/21/2014    Scoliosis 05/20/2014    Carotid artery stenosis 02/10/2014    Irritable bowel syndrome 80/12/0042    Follicular lymphoma ([facility] [address] ) 04/30/2013    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    Osteoarthrosis 02/02/2010    Scoliosis (and kyphoscoliosis), idiopathic 02/02/2010     She  has a past surgical history that includes Lung biopsy; Cholecystectomy; Appendectomy; and Thyroidectomy, partial   Her family history includes Cancer in her maternal grandfather; Diabetes in her mother; Heart disease in her brother and mother  She  reports that she has been smoking Cigarettes  She has never used smokeless tobacco  She reports that she does not drink alcohol or use drugs  Current Outpatient Prescriptions   Medication Sig Dispense Refill    omeprazole (PriLOSEC) 40 MG capsule Take 1 capsule by mouth daily       No current facility-administered medications for this visit  Current Outpatient Prescriptions on File Prior to Visit   Medication Sig    omeprazole (PriLOSEC) 40 MG capsule Take 1 capsule by mouth daily     No current facility-administered medications on file prior to visit  She is allergic to flagyl [metronidazole]; moxifloxacin; other; and penicillins       Review of Systems   Constitutional: Negative for fever  Eyes: Negative  Respiratory: Negative  Skin: Negative for rash and wound  Redness and tenderness 5th toe of right foot, + dry skin   Allergic/Immunologic: Negative for immunocompromised state  Neurological: Negative for weakness  Hematological: Does not bruise/bleed easily  Psychiatric/Behavioral: Negative  All other systems reviewed and are negative  Objective:      /62 (BP Location: Left arm, Patient Position: Sitting)   Pulse 76   Temp (!) 97 4 °F (36 3 °C)   Resp 18   Ht 4' 11" (1 499 m)   Wt 44 6 kg (98 lb 6 oz)   SpO2 94%   BMI 19 87 kg/m²          Physical Exam   Constitutional: She is oriented to person, place, and time  She appears well-developed and well-nourished  No distress  HENT:   Head: Atraumatic     Eyes: Conjunctivae and EOM are normal  Pupils are equal, round, and reactive to light  Neck: Normal range of motion  Neck supple  Cardiovascular: Normal rate  Pulmonary/Chest: Effort normal    Musculoskeletal: Normal range of motion  She exhibits tenderness  Neurological: She is alert and oriented to person, place, and time  Skin: Skin is warm and dry  There is erythema  Right foot, 5th toe, erythema, bluish discoloration  Tender to touch  Right lateral malleolus also reddened  Psychiatric: She has a normal mood and affect  Her behavior is normal  Judgment and thought content normal    Nursing note and vitals reviewed

## 2018-03-30 PROCEDURE — 77336 RADIATION PHYSICS CONSULT: CPT | Performed by: RADIOLOGY

## 2018-03-30 PROCEDURE — 77373 STRTCTC BDY RAD THER TX DLVR: CPT | Performed by: RADIOLOGY

## 2018-04-11 ENCOUNTER — TELEPHONE (OUTPATIENT)
Dept: RADIATION ONCOLOGY | Facility: HOSPITAL | Age: 73
End: 2018-04-11

## 2018-04-11 NOTE — TELEPHONE ENCOUNTER
Pt contacted LSW by phone on 4/11/2018  Pt reports she recently completed her radiation treatment and will be receiving clinical results next month  Pt is anxious to see a demonologist, but is taking her medical care "one step at a time " Pt discussed chronic financial challenges  LSW provided emotional support and encouraged pt to apply for liheap before the deadline on 4/13/2018  LSW will remain available to provide pt cancer care counseling

## 2018-05-08 ENCOUNTER — RADIATION ONCOLOGY FOLLOW-UP (OUTPATIENT)
Dept: RADIATION ONCOLOGY | Facility: HOSPITAL | Age: 73
End: 2018-05-08

## 2018-05-08 ENCOUNTER — APPOINTMENT (OUTPATIENT)
Dept: RADIATION ONCOLOGY | Facility: HOSPITAL | Age: 73
End: 2018-05-08
Attending: RADIOLOGY
Payer: COMMERCIAL

## 2018-05-08 VITALS
RESPIRATION RATE: 16 BRPM | HEART RATE: 87 BPM | BODY MASS INDEX: 20.24 KG/M2 | DIASTOLIC BLOOD PRESSURE: 60 MMHG | HEIGHT: 59 IN | TEMPERATURE: 98.2 F | WEIGHT: 100.4 LBS | SYSTOLIC BLOOD PRESSURE: 92 MMHG

## 2018-05-08 DIAGNOSIS — C34.31 CANCER OF LOWER LOBE OF RIGHT LUNG (HCC): Primary | ICD-10-CM

## 2018-05-08 DIAGNOSIS — C82.80 OTHER TYPE OF FOLLICULAR LYMPHOMA, UNSPECIFIED BODY REGION (HCC): ICD-10-CM

## 2018-05-08 DIAGNOSIS — C34.11 CANCER OF UPPER LOBE OF RIGHT LUNG (HCC): ICD-10-CM

## 2018-05-08 PROCEDURE — G0463 HOSPITAL OUTPT CLINIC VISIT: HCPCS | Performed by: RADIOLOGY

## 2018-05-08 PROCEDURE — 99215 OFFICE O/P EST HI 40 MIN: CPT | Performed by: RADIOLOGY

## 2018-05-08 NOTE — PROGRESS NOTES
Joann Payment  1945   Ms Betina Kim is a 67 y o  female       Chief Complaint   Patient presents with    Follow-up     Lung Cancer       Cancer Staging  No matching staging information was found for the patient  Lymphoma ([facility] [address] )    2009 Initial Diagnosis     Lymphoma ([facility] [address] ) stage IV follicular lymphoma          9/2010 -  Chemotherapy     rituximab x4         2012 -  Chemotherapy     Treanda/rituximab x 4 cycles         9/20/2013 - 10/7/2013 Radiation     Right inguinal node to 2400cGy C1    Plan ID Energy Fractions Dose per Fraction (cGy) Total Dose Delivered (cGy) Elapsed Days   Rt Ingui Node 6X 12 / 12 200 2,400 17                Cancer of upper lobe of right lung ([address] )    2015 Initial Diagnosis     stage I lung carcinoma of the right upper lobe          11/4/2015 Biopsy     CT-guided needle lung biopsy on November 4, 2015 showed hyperplasia, but no definite malignancy  Her cellblock revealed atypical cellular changes and could not exclude a neoplasm  She suffered a pneumothorax after the biopsy which required admission to the hospital and chest tube placement             12/17/2015 - 12/30/2015 Radiation     SBRT right upper lung C2 SBRT    Plan ID Energy Fractions Dose per Fraction (cGy) Total Dose Delivered (cGy) Elapsed Days   SBRT Rt Lung 6X 5 / 5 1,000 5,000 13                Cancer of lower lobe of right lung ([address] )    2/9/2018 Initial Diagnosis     Cancer of lower lobe of right lung ([address] )       3/20/2018 - 3/30/2018 Radiation       Course: C3 SBRT    Plan ID Energy Fractions Dose per Fraction (cGy) Total Dose Delivered (cGy) Elapsed Days   SBRT RLL 6X 5 / 5 1,000 5,000 10      Treatment dates:  C1: 9/20/2013 - 10/7/2013  C2 SBRT: 12/17/2015 - 12/30/2015  C3 SBRT: 3/20/2018 - 3/30/2018          Clinical Trial: no    Interval History  No other recent imaging  Last visit with Dr Ruperto Boss 1/29/18  Screening  Tobacco  Current tobacco user: yes  If yes, brief counseling provided: Yes    Hypertension  Hypertension screening performed: yes  Normotensive: yes  If no, referred to PCP: n/a    Depression Screening  Screened for depression using PHQ-2: yes    Screened for depression using PHQ-9:  yes  Screening positive or negative:  negative  If score >4, was any of the following actions taken?    Additional evaluation for depression, suicide risk assesment, referral to PCP or psychiatry, medication started:  [address] for Patients >65 years  Advanced Care Planning Discussed:  no  Patient named surrogate decision maker or care plan in chart: no      Health Maintenance   Topic Date Due    Hepatitis C Screening  1945    SLP PLAN OF CARE  1945    COLONOSCOPY  1945    Depression Screening PHQ-9  10/24/1957    Fall Risk  10/24/2010    Urinary Incontinence Screening  10/24/2010    PNEUMOCOCCAL POLYSACCHARIDE VACCINE AGE 72 AND OVER  10/24/2010    GLAUCOMA SCREENING 67+ YR  10/24/2012    INFLUENZA VACCINE  09/01/2018    DTaP,Tdap,and Td Vaccines (2 - Td) 04/27/2025       Patient Active Problem List   Diagnosis    Lymphoma ([address] )    Incidental lung nodule, greater than or equal to 8mm    Cancer of upper lobe of right lung ([address] )    Cancer of lower lobe of right lung ([address] )    Anxiety    Atherosclerosis of native artery of extremity ([address] )    Bilateral hip pain    Carotid artery stenosis    Change in mole    Chronic lumbar pain    COPD exacerbation ([address] )    Follicular lymphoma ([address] )    Inguinal pain    Irritable bowel syndrome    Lung nodule    Nicotine dependence    Osteoarthrosis    Post-herpetic polyneuropathy    PTSD (post-traumatic stress disorder)    Scoliosis    Scoliosis (and kyphoscoliosis), idiopathic    Tobacco use disorder    Vascular disease, peripheral ([address] )    Toe pain, right     Past Medical History:   Diagnosis Date    Chronic pain     GERD (gastroesophageal reflux disease)     Lymphoma (HCC)     OCD (obsessive compulsive disorder)     Psychiatric disorder      Past Surgical History:   Procedure Laterality Date    APPENDECTOMY      CHOLECYSTECTOMY      LUNG BIOPSY      THYROIDECTOMY, PARTIAL       Family History   Problem Relation Age of Onset    Diabetes Mother     Heart disease Mother     Heart disease Brother     Cancer Maternal Grandfather      Social History     Social History    Marital status:      Spouse name: N/A    Number of children: 2    Years of education: N/A     Occupational History    Retired      Social History Main Topics    Smoking status: Current Every Day Smoker     Packs/day: 0 50     Types: Cigarettes    Smokeless tobacco: Never Used    Alcohol use No    Drug use: No    Sexual activity: Not on file     Other Topics Concern    Not on file     Social History Narrative    No narrative on file       Current Outpatient Prescriptions:     omeprazole (PriLOSEC) 40 MG capsule, Take 1 capsule by mouth daily, Disp: , Rfl:   Allergies   Allergen Reactions    Flagyl [Metronidazole]      Other reaction(s): lips swollen/numb nose    Moxifloxacin Tachycardia    Other      Grass, trees    Penicillins Hives     Other reaction(s): Hives       Review of Systems:  Review of Systems   Constitutional: Positive for chills  HENT: Negative  Eyes: Negative  Respiratory: Positive for cough (Chronic productive for clear phlegm)  Cardiovascular: Negative  Gastrointestinal: Negative  Endocrine: Negative  Genitourinary: Positive for urgency  Musculoskeletal:        Generalized pain   Skin: Negative  Allergic/Immunologic: Negative  Neurological: Negative  Hematological: Bruises/bleeds easily  Psychiatric/Behavioral: Positive for sleep disturbance  The patient is nervous/anxious          Vitals:    05/08/18 1309   BP: 92/60   BP Location: Left arm   Patient Position: Sitting   Cuff Size: Standard   Pulse: 87   Resp: 16   Temp: 98 2 °F (36 8 °C)   TempSrc: Tympanic Weight: 45 5 kg (100 lb 6 4 oz)   Height: 4' 11" (1 499 m)       Pain Score:   6    Imaging:No results found

## 2018-05-08 NOTE — PROGRESS NOTES
Follow-up - Radiation Oncology   Bin Bryant 1945 67 y o  female [id number]      History of Present Illness     Bin Bryant is a 67y o  year old female with a 42+ pack year smoking history and past medical history of stage IV follicular lymphoma currently in remission, stage I lung carcinoma of the right upper lobe in 2015 and a clinical stage I right lower lobe lesion consistent with early stage lung carcinoma  She completed  SBRT radiation therapy to the right upper lobe in December of 2015 and the right lower lobe on March 30, 2018  Clinical Trial: no     Interval History  No other recent imaging  Last visit with Dr Pina Peres 1/29/18  She reports she has stable fatigue  She has no change in her respiratory status  She denies any cough shortness of breath fevers nor hemoptysis  She has some chronic lower back discomfort and is trying to get in to see her chiropractor  Screening  Tobacco  Current tobacco user: yes  If yes, brief counseling provided: Yes     Hypertension  Hypertension screening performed: yes  Normotensive: yes  If no, referred to PCP: n/a     Depression Screening  Screened for depression using PHQ-2: yes     Screened for depression using PHQ-9:  yes  Screening positive or negative:  negative  If score >4, was any of the following actions taken?    Additional evaluation for depression, suicide risk assesment, referral to PCP or psychiatry, medication started:  n/a     Advanced Care Planning for Patients >65 years  Advanced Care Planning Discussed:  no  Patient named surrogate decision maker or care plan in chart: no    Historical Information      Lymphoma ([address] [facility] )    2009 Initial Diagnosis     Lymphoma ([facility] [facility] ) stage IV follicular lymphoma          9/2010 -  Chemotherapy     rituximab x4         2012 -  Chemotherapy     Treanda/rituximab x 4 cycles         9/20/2013 - 10/7/2013 Radiation     Right inguinal node to 2400cGy C1    Plan ID Energy Fractions Dose per Fraction (cGy) Total Dose Delivered (cGy) Elapsed Days   Rt Ingui Node 6X 12 / 12 200 2,400 17                Cancer of upper lobe of right lung ([address] )    2015 Initial Diagnosis     stage I lung carcinoma of the right upper lobe          11/4/2015 Biopsy     CT-guided needle lung biopsy on November 4, 2015 showed hyperplasia, but no definite malignancy  Her cellblock revealed atypical cellular changes and could not exclude a neoplasm  She suffered a pneumothorax after the biopsy which required admission to the hospital and chest tube placement             12/17/2015 - 12/30/2015 Radiation     SBRT right upper lung C2 SBRT    Plan ID Energy Fractions Dose per Fraction (cGy) Total Dose Delivered (cGy) Elapsed Days   SBRT Rt Lung 6X 5 / 5 1,000 5,000 13                Cancer of lower lobe of right lung ([address] )    2/9/2018 Initial Diagnosis     Cancer of lower lobe of right lung ([address] )       3/20/2018 - 3/30/2018 Radiation       Course: C3 SBRT    Plan ID Energy Fractions Dose per Fraction (cGy) Total Dose Delivered (cGy) Elapsed Days   SBRT RLL 6X 5 / 5 1,000 5,000 10      Treatment dates:  C1: 9/20/2013 - 10/7/2013  C2 SBRT: 12/17/2015 - 12/30/2015  C3 SBRT: 3/20/2018 - 3/30/2018              Past Medical History:   Diagnosis Date    Chronic pain     GERD (gastroesophageal reflux disease)     Lymphoma (HCC)     OCD (obsessive compulsive disorder)     Psychiatric disorder      Past Surgical History:   Procedure Laterality Date    APPENDECTOMY      CHOLECYSTECTOMY      LUNG BIOPSY      THYROIDECTOMY, PARTIAL         Social History   History   Alcohol Use No     History   Drug Use No     History   Smoking Status    Current Every Day Smoker    Packs/day: 0 50    Types: Cigarettes   Smokeless Tobacco    Never Used         Meds/Allergies     Current Outpatient Prescriptions:     omeprazole (PriLOSEC) 40 MG capsule, Take 1 capsule by mouth daily, Disp: , Rfl:   Allergies   Allergen Reactions    Flagyl [Metronidazole]      Other reaction(s): lips swollen/numb nose    Moxifloxacin Tachycardia    Other      Grass, trees    Penicillins Hives     Other reaction(s): Hives     Review of Systems  Constitutional: Positive for chills  HENT: Negative  Eyes: Negative  Respiratory: Positive for cough (Chronic productive for clear phlegm)  Cardiovascular: Negative  Gastrointestinal: Negative  Endocrine: Negative  Genitourinary: Positive for urgency  Musculoskeletal:        Generalized pain   Skin: Negative  Allergic/Immunologic: Negative  Neurological: Negative  Hematological: Bruises/bleeds easily  Psychiatric/Behavioral: Positive for sleep disturbance  The patient is nervous/anxious  OBJECTIVE:   BP 92/60 (BP Location: Left arm, Patient Position: Sitting, Cuff Size: Standard)   Pulse 87   Temp 98 2 °F (36 8 °C) (Tympanic)   Resp 16   Ht 4' 11" (1 499 m)   Wt 45 5 kg (100 lb 6 4 oz)   BMI 20 28 kg/m²   Pain Assessment:  1  ECOG/Zubrod/WHO: 1 - Symptomatic but completely ambulatory    Physical Exam   Constitutional: She is oriented to person, place, and time  She appears well-developed and well-nourished  No distress  HENT:   Head: Normocephalic and atraumatic  Mouth/Throat: No oropharyngeal exudate  Eyes: Conjunctivae and EOM are normal  Pupils are equal, round, and reactive to light  No scleral icterus  Neck: Normal range of motion  Neck supple  No tracheal deviation present  No thyromegaly present  Cardiovascular: Normal rate, regular rhythm and normal heart sounds  Pulmonary/Chest: Effort normal and breath sounds normal  No respiratory distress  She has no wheezes  She has no rales  She exhibits no tenderness  She has diffusely decreased breath sounds bilaterally  Abdominal: Soft  Bowel sounds are normal  She exhibits no distension and no mass  There is no tenderness  Musculoskeletal: Normal range of motion  She exhibits no edema or tenderness     Lymphadenopathy:     She has no cervical adenopathy  Neurological: She is alert and oriented to person, place, and time  No cranial nerve deficit  Coordination normal    Skin: Skin is warm and dry  No rash noted  She is not diaphoretic  No erythema  No pallor  Psychiatric: She has a normal mood and affect  Her behavior is normal  Judgment and thought content normal    Nursing note and vitals reviewed  RESULTS    Lab Results: No results found for this or any previous visit (from the past 672 hour(s))  Imaging Studies:No results found  Assessment/Plan:  Orders Placed This Encounter   Procedures    NM PET CT skull base to mid thigh        Andreina Sequeira is a 67y o  year old female with a 42+ pack year smoking history and past medical history of stage IV follicular lymphoma currently in remission, stage I lung carcinoma of the right upper lobe in 2015 and a clinical stage I right lower lobe lesion consistent with early stage lung carcinoma  She completed  SBRT radiation therapy to the right upper lobe in December of 2015 and the right lower lobe on March 30, 2018  For follow-up examination  She has recovered well from treatment and has no change in her respiratory status  We recommended a repeat PET-CT study be done 3 months after the completion of treatment  This will be scheduled for early July 2018 and then she will return for follow-up  Garima Christianson MD  5/8/2018,2:02 PM    Portions of the record may have been created with voice recognition software   Occasional wrong word or "sound a like" substitutions may have occurred due to the inherent limitations of voice recognition software   Read the chart carefully and recognize, using context, where substitutions have occurred

## 2018-07-03 ENCOUNTER — HOSPITAL ENCOUNTER (OUTPATIENT)
Dept: RADIOLOGY | Age: 73
Discharge: HOME | End: 2018-07-03
Payer: COMMERCIAL

## 2018-07-03 VITALS — BODY MASS INDEX: 19.61 KG/M2 | WEIGHT: 97.1 LBS

## 2018-07-03 DIAGNOSIS — C82.80 OTHER TYPE OF FOLLICULAR LYMPHOMA, UNSPECIFIED BODY REGION (HCC): ICD-10-CM

## 2018-07-03 DIAGNOSIS — C34.31 CANCER OF LOWER LOBE OF RIGHT LUNG (HCC): ICD-10-CM

## 2018-07-03 DIAGNOSIS — C34.11 CANCER OF UPPER LOBE OF RIGHT LUNG (HCC): ICD-10-CM

## 2018-07-03 LAB — GLUCOSE SERPL-MCNC: 99 MG/DL (ref 65–140)

## 2018-07-03 PROCEDURE — 82948 REAGENT STRIP/BLOOD GLUCOSE: CPT

## 2018-07-03 PROCEDURE — A9552 F18 FDG: HCPCS

## 2018-07-03 PROCEDURE — 78815 PET IMAGE W/CT SKULL-THIGH: CPT

## 2018-07-03 RX ADMIN — IOHEXOL 5 ML: 240 INJECTION, SOLUTION INTRATHECAL; INTRAVASCULAR; INTRAVENOUS; ORAL at 12:05

## 2018-07-10 ENCOUNTER — RADIATION ONCOLOGY FOLLOW-UP (OUTPATIENT)
Dept: RADIATION ONCOLOGY | Facility: HOSPITAL | Age: 73
End: 2018-07-10
Attending: RADIOLOGY
Payer: COMMERCIAL

## 2018-07-10 ENCOUNTER — CLINICAL SUPPORT (OUTPATIENT)
Dept: RADIATION ONCOLOGY | Facility: HOSPITAL | Age: 73
End: 2018-07-10
Payer: COMMERCIAL

## 2018-07-10 VITALS
DIASTOLIC BLOOD PRESSURE: 68 MMHG | HEART RATE: 71 BPM | SYSTOLIC BLOOD PRESSURE: 100 MMHG | WEIGHT: 98 LBS | BODY MASS INDEX: 19.79 KG/M2 | TEMPERATURE: 98.2 F

## 2018-07-10 DIAGNOSIS — C34.11 CANCER OF UPPER LOBE OF RIGHT LUNG (HCC): ICD-10-CM

## 2018-07-10 DIAGNOSIS — C34.31 CANCER OF LOWER LOBE OF RIGHT LUNG (HCC): Primary | ICD-10-CM

## 2018-07-10 DIAGNOSIS — C82.80 OTHER TYPE OF FOLLICULAR LYMPHOMA, UNSPECIFIED BODY REGION (HCC): ICD-10-CM

## 2018-07-10 DIAGNOSIS — C34.90 MALIGNANT NEOPLASM OF LUNG, UNSPECIFIED LATERALITY, UNSPECIFIED PART OF LUNG (HCC): Primary | ICD-10-CM

## 2018-07-10 PROCEDURE — G0463 HOSPITAL OUTPT CLINIC VISIT: HCPCS | Performed by: RADIOLOGY

## 2018-07-10 PROCEDURE — 99215 OFFICE O/P EST HI 40 MIN: CPT | Performed by: RADIOLOGY

## 2018-07-10 NOTE — PROGRESS NOTES
Aminah Salinas  1945   Ms Farrukh House is a 67 y o  female       Chief Complaint   Patient presents with    Lung Cancer       Cancer Staging  No matching staging information was found for the patient  Oncology History    Last seen 5/8/18    7/3/18 PET  IMPRESSION:     1  The right lower lobe lesion has diminished in size and demonstrate significantly diminished glucose avidity  2  Small mildly hypermetabolic right axillary lymph node is unchanged  Additionally, no significant trend of either worsening or improving activity within mediastinal and hilar lymph nodes  3  New small mildly hypermetabolic right cervical lymph node and foci of increased activity within the abdomen, with SUV max of 5 3 in the right upper quadrant  Consider further evaluation with contrast-enhanced CT of the abdomen and pelvis, utilizing   both oral and IV contrast, to exclude the possibility of developing/worsening adenopathy  4  No evidence of metastatic disease in the pelvis or osseous structures    7/30/18 Dr Leland [facility])    2009 Initial Diagnosis     Lymphoma ([facility] ) stage IV follicular lymphoma          9/2010 -  Chemotherapy     rituximab x4         2012 -  Chemotherapy     Treanda/rituximab x 4 cycles         9/20/2013 - 10/7/2013 Radiation     Right inguinal node to 2400cGy C1    Plan ID Energy Fractions Dose per Fraction (cGy) Total Dose Delivered (cGy) Elapsed Days   Rt Ingui Node 6X 12 / 12 200 2,400 17                Cancer of upper lobe of right lung ([address] )    2015 Initial Diagnosis     stage I lung carcinoma of the right upper lobe          11/4/2015 Biopsy     CT-guided needle lung biopsy on November 4, 2015 showed hyperplasia, but no definite malignancy  Her cellblock revealed atypical cellular changes and could not exclude a neoplasm  She suffered a pneumothorax after the biopsy which required admission to the hospital and chest tube placement             12/17/2015 - 12/30/2015 Radiation     SBRT right upper lung C2 SBRT    Plan ID Energy Fractions Dose per Fraction (cGy) Total Dose Delivered (cGy) Elapsed Days   SBRT Rt Lung 6X 5 / 5 1,000 5,000 13                Cancer of lower lobe of right lung ([facility] [address] [facility] )    2/9/2018 Initial Diagnosis     Cancer of lower lobe of right lung ([facility] [address] )       3/20/2018 - 3/30/2018 Radiation     SBRT    Plan ID Energy Fractions Dose per Fraction (cGy) Total Dose Delivered (cGy) Elapsed Days   SBRT RLL 6X 5 / 5 1,000 5,000 10                    Clinical Trial: no        Interval History   Last seen 5/8/18    7/3/18 PET  IMPRESSION:     1  The right lower lobe lesion has diminished in size and demonstrate significantly diminished glucose avidity  2  Small mildly hypermetabolic right axillary lymph node is unchanged  Additionally, no significant trend of either worsening or improving activity within mediastinal and hilar lymph nodes  3  New small mildly hypermetabolic right cervical lymph node and foci of increased activity within the abdomen, with SUV max of 5 3 in the right upper quadrant  Consider further evaluation with contrast-enhanced CT of the abdomen and pelvis, utilizing   both oral and IV contrast, to exclude the possibility of developing/worsening adenopathy  4  No evidence of metastatic disease in the pelvis or osseous structures    7/30/18 Dr Mai Rojas    Screening  Tobacco  Current tobacco user: yes  If yes, brief counseling provided: Yes    Hypertension  Hypertension screening performed: yes  Normotensive:  yes  If no, referred to PCP: n/a    Depression Screening  Screened for depression using PHQ-2: no    Screened for depression using PHQ-9:  n/a  Screening positive or negative:  negative  If score >4, was any of the following actions taken?    Additional evaluation for depression, suicide risk assesment, referral to PCP or psychiatry, medication started:  [id number] [facility] for Patients >65 years  Advanced Care Planning Discussed:  no  Patient named surrogate decision maker or care plan in chart: n/a      Health Maintenance   Topic Date Due    Hepatitis C Screening  1945    SLP PLAN OF CARE  1945    CRC Screening: Colonoscopy  1945    Fall Risk  10/24/2010    Urinary Incontinence Screening  10/24/2010    PNEUMOCOCCAL POLYSACCHARIDE VACCINE AGE 72 AND OVER  10/24/2010    GLAUCOMA SCREENING 65 + YR  10/24/2012    INFLUENZA VACCINE  09/01/2018    Depression Screening PHQ-9  05/08/2019    DTaP,Tdap,and Td Vaccines (2 - Td) 04/27/2025       Patient Active Problem List   Diagnosis    Lymphoma [facility] )    Incidental lung nodule, greater than or equal to 8mm    Cancer of upper lobe of right lung ([facility] )    Cancer of lower lobe of right lung ([facility] )    Anxiety    Atherosclerosis of native artery of extremity (HCC)    Bilateral hip pain    Carotid artery stenosis    Change in mole    Chronic lumbar pain    COPD exacerbation (HCC)    Follicular lymphoma (HCC)    Inguinal pain    Irritable bowel syndrome    Lung nodule    Nicotine dependence    Osteoarthrosis    Post-herpetic polyneuropathy    PTSD (post-traumatic stress disorder)    Scoliosis    Scoliosis (and kyphoscoliosis), idiopathic    Tobacco use disorder    Vascular disease, peripheral (HCC)    Toe pain, right     Past Medical History:   Diagnosis Date    Carotid artery stenosis     last assessed: 5/11/2016    Chronic pain     GERD (gastroesophageal reflux disease)     History of chemotherapy     Lymphoma ([facility] )     OCD (obsessive compulsive disorder)     Peptic ulcer     last assessed: 8/6/2014    Psychiatric disorder      Past Surgical History:   Procedure Laterality Date    APPENDECTOMY      CHOLECYSTECTOMY      GALLBLADDER SURGERY      LUNG BIOPSY      THORACOTOMY  2015    with biopsy of lung nodules    THYROIDECTOMY, PARTIAL      thryoid surgery alla-thyroidectomy     Family History   Problem Relation Age of Onset    Diabetes Mother     Heart disease Mother  Heart disease Brother     Cancer Maternal Grandfather         head and neck     Cancer Other      Social History     Social History    Marital status:      Spouse name: N/A    Number of children: 2    Years of education: N/A     Occupational History    Retired      Social History Main Topics    Smoking status: Current Every Day Smoker     Packs/day: 0 50     Types: Cigarettes    Smokeless tobacco: Never Used      Comment: pt stated smokes 1/2 ppd for 30+ yrs    Alcohol use No    Drug use: No      Comment: recovering drug addiction (as per allscripts)    Sexual activity: Not on file     Other Topics Concern    Not on file     Social History Narrative    Denied: caffeine use       Current Outpatient Prescriptions:     omeprazole (PriLOSEC) 40 MG capsule, Take 1 capsule by mouth daily, Disp: , Rfl:   Allergies   Allergen Reactions    Flagyl [Metronidazole]      Other reaction(s): lips swollen/numb nose    Moxifloxacin Tachycardia    Other      Grass, trees    Penicillins Hives     Other reaction(s): Hives       Review of Systems:  Review of Systems   Constitutional: Positive for appetite change (fluctuates) and diaphoresis (night sweats)  HENT: Negative  Eyes:        Right eye cyst     Respiratory: Positive for cough  Cardiovascular: Negative  Gastrointestinal: Positive for nausea (occassional)  Endocrine: Negative  Genitourinary: Negative  Musculoskeletal: Positive for arthralgias (aches everywhere), back pain and neck pain  Skin: Negative  Allergic/Immunologic: Negative  Neurological: Positive for dizziness and numbness  Hematological: Negative  Psychiatric/Behavioral: Positive for sleep disturbance         Vitals:    07/10/18 1533   BP: 100/68   Pulse: 71   Temp: 98 2 °F (36 8 °C)   Weight: 44 5 kg (98 lb)       Pain Score:   7 (02 98)    Imaging:Nm Pet Ct Skull Base To Mid Thigh    Result Date: 7/3/2018  Narrative: PET/CT SCAN INDICATION: History of recurrent follicular lymphoma  Presumptive lung carcinoma as well  Restaging examination following radiation therapy to the right lung lesion  MODIFIER: PS COMPARISON: Chest CT performed on 10/20/2017, prior PET scan 1/76/3251 CELL TYPE:  Follicular lymphoma TECHNIQUE:   8 3 mCi F-18-FDG administered IV  Multiplanar attenuation corrected and non attenuation corrected PET images are available for interpretation, and contiguous, low dose, axial CT sections were obtained from the skull base through the femurs   Oral contrast material (7 5 cc Omnipaque-240 in 300 cc water) was administered  Intravenous contrast material was not utilized  This examination, like all CT scans performed in the [facility], was performed utilizing techniques to minimize radiation dose exposure, including the use of iterative reconstruction and automated exposure control  Fasting serum glucose: 99 mg/dl FINDINGS: VISUALIZED BRAIN:   No acute abnormalities are seen  HEAD/NECK:   There is a new small discrete focus of hypermetabolism identified along the anterior margin of the right sternocleidomastoid muscle on image 60, SUV max is 2 9  This likely corresponds to a subtle lymph node measuring about 7 mm in diameter  No additional hypermetabolic foci CHEST:   The right lower lobe lesion earlier measured 9 x 8 mm with SUV max 3 3  This now measures 6 x 6 mm, SUV max significantly diminished, at 1 1  Patchy parenchymal disease in the anterior right midlung field remains unchanged from the prior study and again demonstrates low level glucose activity without change  No new pulmonary nodules are demonstrated  There is a right axillary lymph node measuring approximately 10 mm, unchanged in size from prior study  Previous SUV max 1 4 now 1 5  Pretracheal activity noted, earlier SUV max 2 6 currently 3 2  Right hilar activity noted, earlier SUV max 2 1 now 2 3  AP window activity on image 109 noted    Size of lymph nodes difficult to determine secondary to lack of surrounding mediastinal adipose tissue  Prior SUV max 2 5 currently 2 7  Pulmonary emphysema is present  There is no pleural or pericardial effusion  Coronary artery calcification noted ABDOMEN:   Evaluation of the abdomen is limited secondary to a paucity of intra-abdominal adipose tissue  On image 154, there is a focus of increased activity with SUV max of 5 3  Based on area of activity, there is corresponds to a region measuring about 19 mm  A larger focus of activity is seen in the right upper quadrant on image 169, SUV max 9 0, again based on size of activity, 2 8 cm  A 3rd region is seen on image 171, SUV max 2 7, approximately 1 cm in size  CT images: There is no development of ascites  No evidence of intrahepatic metastasis  No hydronephrosis or bowel obstruction  PELVIS: No FDG avid soft tissue lesions are seen  CT images: Unremarkable  OSSEOUS STRUCTURES: No FDG avid lesions are seen  CT images: No acute finding     Impression: 1  The right lower lobe lesion has diminished in size and demonstrate significantly diminished glucose avidity 2  Small mildly hypermetabolic right axillary lymph node is unchanged  Additionally, no significant trend of either worsening or improving activity within mediastinal and hilar lymph nodes  3  New small mildly hypermetabolic right cervical lymph node and foci of increased activity within the abdomen, with SUV max of 5 3 in the right upper quadrant  Consider further evaluation with contrast-enhanced CT of the abdomen and pelvis, utilizing both oral and IV contrast, to exclude the possibility of developing/worsening adenopathy  4  No evidence of metastatic disease in the pelvis or osseous structures   Workstation performed: [id number]       Teaching

## 2018-07-18 NOTE — PROGRESS NOTES
Follow-up - Radiation Oncology   Toy Britton 1945 67 y o  female [id number]      History of Present Illness   Cancer Staging  No matching staging information was found for the patient  Toy Britton is a 67y o  year old female with a 42+ pack year smoking history and past medical history of stage IV follicular lymphoma currently in remission, stage I lung carcinoma of the right upper lobe in 2015 and a clinical stage I right lower lobe lesion consistent with early stage lung carcinoma  She completed  SBRT radiation therapy to the right upper lobe in December of 2015 and the right lower lobe on March 30, 2018  She was last seen 5/8/18  Interval History:   7/3/18 PET  IMPRESSION:     1  The right lower lobe lesion has diminished in size and demonstrate significantly diminished glucose avidity  2  Small mildly hypermetabolic right axillary lymph node is unchanged   Additionally, no significant trend of either worsening or improving activity within mediastinal and hilar lymph nodes  3  New small mildly hypermetabolic right cervical lymph node and foci of increased activity within the abdomen, with SUV max of 5 3 in the right upper quadrant   Consider further evaluation with contrast-enhanced CT of the abdomen and pelvis, utilizing   both oral and IV contrast, to exclude the possibility of developing/worsening adenopathy  4  No evidence of metastatic disease in the pelvis or osseous structures     7/30/18 Dr Jaydon Burgos    Clinical Trial: no     Screening  Tobacco  Current tobacco user: yes  If yes, brief counseling provided: Yes     Hypertension  Hypertension screening performed: yes  Normotensive:  yes  If no, referred to PCP: n/a     Depression Screening  Screened for depression using PHQ-2: no     Screened for depression using PHQ-9:  n/a  Screening positive or negative:  negative  If score >4, was any of the following actions taken?    Additional evaluation for depression, suicide risk assesment, referral to PCP or psychiatry, medication started:  n/a     Advanced Care Planning for Patients >65 years  Advanced Care Planning Discussed:  no  Patient named surrogate decision maker or care plan in chart: n/a     Historical Information      Lymphoma ([facility] )    2009 Initial Diagnosis     L[facility] ([facility] ) stage IV follicular lymphoma          9/2010 -  Chemotherapy     rituximab x4         2012 -  Chemotherapy     Treanda/rituximab x 4 cycles         9/20/2013 - 10/7/2013 Radiation     Right inguinal node to 2400cGy C1    Plan ID Energy Fractions Dose per Fraction (cGy) Total Dose Delivered (cGy) Elapsed Days   Rt Ingui Node 6X 12 / 12 200 2,400 17                Cancer of upper lobe of right lung ([facility] )    2015 Initial Diagnosis     stage I lung carcinoma of the right upper lobe          11/4/2015 Biopsy     CT-guided needle lung biopsy on November 4, 2015 showed hyperplasia, but no definite malignancy  Her cellblock revealed atypical cellular changes and could not exclude a neoplasm  She suffered a pneumothorax after the biopsy which required admission to the hospital and chest tube placement             12/17/2015 - 12/30/2015 Radiation     SBRT right upper lung C2 SBRT    Plan ID Energy Fractions Dose per Fraction (cGy) Total Dose Delivered (cGy) Elapsed Days   SBRT Rt Lung 6X 5 / 5 1,000 5,000 13                Cancer of lower lobe of right lung ([facility] )    2/9/2018 Initial Diagnosis     Cancer of lower lobe of right lung ([facility] )       3/20/2018 - 3/30/2018 Radiation     SBRT    Plan ID Energy Fractions Dose per Fraction (cGy) Total Dose Delivered (cGy) Elapsed Days   SBRT RLL 6X 5 / 5 1,000 5,000 10                    Past Medical History:   Diagnosis Date    Carotid artery stenosis     last assessed: 5/11/2016    Chronic pain     GERD (gastroesophageal reflux disease)     History of chemotherapy     [facility]     OCD (obsessive compulsive disorder)     Peptic ulcer     last assessed: 8/6/2014    Psychiatric disorder      Past Surgical History:   Procedure Laterality Date    APPENDECTOMY      CHOLECYSTECTOMY      GALLBLADDER SURGERY      LUNG BIOPSY      THORACOTOMY  2015    with biopsy of lung nodules    THYROIDECTOMY, PARTIAL      thryoid surgery alla-thyroidectomy       Social History   History   Alcohol Use No     History   Drug Use No     Comment: recovering drug addiction (as per allscripts)     History   Smoking Status    Current Every Day Smoker    Packs/day: 0 50    Types: Cigarettes   Smokeless Tobacco    Never Used     Comment: pt stated smokes 1/2 ppd for 30+ yrs         Meds/Allergies     Current Outpatient Prescriptions:     omeprazole (PriLOSEC) 40 MG capsule, Take 1 capsule by mouth daily, Disp: , Rfl:   Allergies   Allergen Reactions    Flagyl [Metronidazole]      Other reaction(s): lips swollen/numb nose    Moxifloxacin Tachycardia    Other      Grass, trees    Penicillins Hives     Other reaction(s): Hives     Review of Systems   Constitutional: Positive for appetite change (fluctuates) and diaphoresis (night sweats)  HENT: Negative  Eyes:        Right eye cyst     Respiratory: Positive for cough  Cardiovascular: Negative  Gastrointestinal: Positive for nausea (occassional)  Endocrine: Negative  Genitourinary: Negative  Musculoskeletal: Positive for arthralgias (aches everywhere), back pain and neck pain  Skin: Negative  Allergic/Immunologic: Negative  Neurological: Positive for dizziness and numbness  Hematological: Negative  Psychiatric/Behavioral: Positive for sleep disturbance  OBJECTIVE:   /68   Pulse 71   Temp 98 2 °F (36 8 °C)   Wt 44 5 kg (98 lb)   BMI 19 79 kg/m²   Pain Assessment:  5  ECOG/Zubrod/WHO: 1 - Symptomatic but completely ambulatory    Physical Exam   Constitutional: She is oriented to person, place, and time  She appears well-developed and well-nourished  No distress     HENT:   Head: Normocephalic and atraumatic  Mouth/Throat: No oropharyngeal exudate  Eyes: Conjunctivae and EOM are normal  Pupils are equal, round, and reactive to light  No scleral icterus  Neck: Normal range of motion  Neck supple  No tracheal deviation present  No thyromegaly present  Cardiovascular: Normal rate, regular rhythm and normal heart sounds  Pulmonary/Chest: Effort normal and breath sounds normal  No respiratory distress  She has no wheezes  She has no rales  She exhibits no tenderness  Diffusely decreased breath sounds bilaterally  Abdominal: Soft  Bowel sounds are normal  She exhibits no distension and no mass  There is no tenderness  Musculoskeletal: Normal range of motion  She exhibits no edema or tenderness  Lymphadenopathy:     She has no cervical adenopathy  She has no axillary adenopathy  Right: No supraclavicular adenopathy present  Left: No supraclavicular adenopathy present  Neurological: She is alert and oriented to person, place, and time  No cranial nerve deficit  Coordination normal    Skin: Skin is warm and dry  No rash noted  She is not diaphoretic  No erythema  No pallor  Psychiatric: She has a normal mood and affect  Her behavior is normal  Judgment and thought content normal    Nursing note and vitals reviewed  RESULTS    Lab Results:   Recent Results (from the past 672 hour(s))   Fingerstick Glucose (POCT)    Collection Time: 07/03/18 11:58 AM   Result Value Ref Range    POC Glucose 99 65 - 140 mg/dl       Imaging Studies:Nm Pet Ct Skull Base To Mid Thigh    Result Date: 7/3/2018  Narrative: PET/CT SCAN INDICATION: History of recurrent follicular lymphoma  Presumptive lung carcinoma as well  Restaging examination following radiation therapy to the right lung lesion  MODIFIER: PS COMPARISON: Chest CT performed on 10/20/2017, prior PET scan 6/84/0431 CELL TYPE:  Follicular lymphoma TECHNIQUE:   8 3 mCi F-18-FDG administered IV   Multiplanar attenuation corrected and non attenuation corrected PET images are available for interpretation, and contiguous, low dose, axial CT sections were obtained from the skull base through the femurs   Oral contrast material (7 5 cc Omnipaque-240 in 300 cc water) was administered  Intravenous contrast material was not utilized  This examination, like all CT scans performed in the [address] [place] [address], was performed utilizing techniques to minimize radiation dose exposure, including the use of iterative reconstruction and automated exposure control  Fasting serum glucose: 99 mg/dl FINDINGS: VISUALIZED BRAIN:   No acute abnormalities are seen  HEAD/NECK:   There is a new small discrete focus of hypermetabolism identified along the anterior margin of the right sternocleidomastoid muscle on image 60, SUV max is 2 9  This likely corresponds to a subtle lymph node measuring about 7 mm in diameter  No additional hypermetabolic foci CHEST:   The right lower lobe lesion earlier measured 9 x 8 mm with SUV max 3 3  This now measures 6 x 6 mm, SUV max significantly diminished, at 1 1  Patchy parenchymal disease in the anterior right midlung field remains unchanged from the prior study and again demonstrates low level glucose activity without change  No new pulmonary nodules are demonstrated  There is a right axillary lymph node measuring approximately 10 mm, unchanged in size from prior study  Previous SUV max 1 4 now 1 5  Pretracheal activity noted, earlier SUV max 2 6 currently 3 2  Right hilar activity noted, earlier SUV max 2 1 now 2 3  AP window activity on image 109 noted  Size of lymph nodes difficult to determine secondary to lack of surrounding mediastinal adipose tissue  Prior SUV max 2 5 currently 2 7  Pulmonary emphysema is present  There is no pleural or pericardial effusion  Coronary artery calcification noted ABDOMEN:   Evaluation of the abdomen is limited secondary to a paucity of intra-abdominal adipose tissue    On image 154, there is a focus of increased activity with SUV max of 5 3  Based on area of activity, there is corresponds to a region measuring about 19 mm  A larger focus of activity is seen in the right upper quadrant on image 169, SUV max 9 0, again based on size of activity, 2 8 cm  A 3rd region is seen on image 171, SUV max 2 7, approximately 1 cm in size  CT images: There is no development of ascites  No evidence of intrahepatic metastasis  No hydronephrosis or bowel obstruction  PELVIS: No FDG avid soft tissue lesions are seen  CT images: Unremarkable  OSSEOUS STRUCTURES: No FDG avid lesions are seen  CT images: No acute finding     Impression: 1  The right lower lobe lesion has diminished in size and demonstrate significantly diminished glucose avidity 2  Small mildly hypermetabolic right axillary lymph node is unchanged  Additionally, no significant trend of either worsening or improving activity within mediastinal and hilar lymph nodes  3  New small mildly hypermetabolic right cervical lymph node and foci of increased activity within the abdomen, with SUV max of 5 3 in the right upper quadrant  Consider further evaluation with contrast-enhanced CT of the abdomen and pelvis, utilizing both oral and IV contrast, to exclude the possibility of developing/worsening adenopathy  4  No evidence of metastatic disease in the pelvis or osseous structures  Workstation performed: [id number]     Assessment/Plan:  No orders of the defined types were placed in this encounter  Abigail Palacio is a 67y o  year old female with a 42+ pack year smoking history and past medical history of stage IV follicular lymphoma currently in remission, stage I lung carcinoma of the right upper lobe in 2015 and a clinical stage I right lower lobe lesion consistent with early stage lung carcinoma  She completed  SBRT radiation therapy to the right upper lobe in December of 2015 and the right lower lobe on March 30, 2018   She returns for follow-up examination today  She reports she is feeling well and has no change in her respiratory status  She denies any cough nor hemoptysis  We recommended a repeat PET-CT study be done 3 months after the completion of treatment and this was performed on July 3, 2018  This revealed decreased size and significantly decreased hypermetabolism in the right lower lobe lesion without any evidence of new pulmonary nodules and no mediastinal disease  There was a small stable mildly hypermetabolic right axillary lymph node  There was a new small mildly hypermetabolic right cervical lymph node and foci of increased activity in the abdomen with an SUV of 5 3 in the right upper quadrant  Recommendations were made by Radiology  to consider further evaluation with contrast enhanced CT of the abdomen and pelvis  This was discussed with the patient and she does not wish to pursue a CT scan of the abdomen and pelvis at this time  She will think about this and discuss this further when she sees Dr Antwan Fernandez July 30, 2018  She will also discussed repeat imaging of the chest with him which we would recommend in 4-6 months  She will return for follow-up here in 6 months  Favio Delgadillo MD  7/10/2018, 5:00PM    Portions of the record may have been created with voice recognition software   Occasional wrong word or "sound a like" substitutions may have occurred due to the inherent limitations of voice recognition software   Read the chart carefully and recognize, using context, where substitutions have occurred

## 2018-07-27 LAB
ALBUMIN SERPL-MCNC: 4.4 G/DL (ref 3.5–4.8)
ALBUMIN/GLOB SERPL: 1.8 {RATIO} (ref 1.2–2.2)
ALP SERPL-CCNC: 85 IU/L (ref 39–117)
ALT SERPL-CCNC: 22 IU/L (ref 0–32)
AMBIG ABBREV DEFAULT: NORMAL
AST SERPL-CCNC: 27 IU/L (ref 0–40)
BASOPHILS # BLD AUTO: 0 X10E3/UL (ref 0–0.2)
BASOPHILS NFR BLD AUTO: 0 %
BILIRUB SERPL-MCNC: 0.3 MG/DL (ref 0–1.2)
BUN SERPL-MCNC: 12 MG/DL (ref 8–27)
BUN/CREAT SERPL: 15 (ref 12–28)
CALCIUM SERPL-MCNC: 9.2 MG/DL (ref 8.7–10.3)
CHLORIDE SERPL-SCNC: 95 MMOL/L (ref 96–106)
CO2 SERPL-SCNC: 25 MMOL/L (ref 20–29)
CREAT SERPL-MCNC: 0.81 MG/DL (ref 0.57–1)
EOSINOPHIL # BLD AUTO: 0 X10E3/UL (ref 0–0.4)
EOSINOPHIL NFR BLD AUTO: 1 %
ERYTHROCYTE [DISTWIDTH] IN BLOOD BY AUTOMATED COUNT: 14 % (ref 12.3–15.4)
GLOBULIN SER-MCNC: 2.4 G/DL (ref 1.5–4.5)
GLUCOSE SERPL-MCNC: 102 MG/DL (ref 65–99)
HCT VFR BLD AUTO: 43.7 % (ref 34–46.6)
HGB BLD-MCNC: 15 G/DL (ref 11.1–15.9)
IMM GRANULOCYTES # BLD: 0 X10E3/UL (ref 0–0.1)
IMM GRANULOCYTES NFR BLD: 0 %
LDH SERPL-CCNC: 202 IU/L (ref 119–226)
LYMPHOCYTES # BLD AUTO: 1.6 X10E3/UL (ref 0.7–3.1)
LYMPHOCYTES NFR BLD AUTO: 31 %
MCH RBC QN AUTO: 33 PG (ref 26.6–33)
MCHC RBC AUTO-ENTMCNC: 34.3 G/DL (ref 31.5–35.7)
MCV RBC AUTO: 96 FL (ref 79–97)
MONOCYTES # BLD AUTO: 0.5 X10E3/UL (ref 0.1–0.9)
MONOCYTES NFR BLD AUTO: 10 %
NEUTROPHILS # BLD AUTO: 3.2 X10E3/UL (ref 1.4–7)
NEUTROPHILS NFR BLD AUTO: 58 %
PLATELET # BLD AUTO: 218 X10E3/UL (ref 150–379)
POTASSIUM SERPL-SCNC: 4.6 MMOL/L (ref 3.5–5.2)
PROT SERPL-MCNC: 6.8 G/DL (ref 6–8.5)
RBC # BLD AUTO: 4.55 X10E6/UL (ref 3.77–5.28)
SL AMB EGFR AFRICAN AMERICAN: 84 ML/MIN/1.73
SL AMB EGFR NON AFRICAN AMERICAN: 73 ML/MIN/1.73
SODIUM SERPL-SCNC: 137 MMOL/L (ref 134–144)
WBC # BLD AUTO: 5.4 X10E3/UL (ref 3.4–10.8)

## 2018-08-23 NOTE — PROGRESS NOTES
Hematology/Oncology Outpatient Follow- up Note  Hannah Wheeler 67 y o  female MRN: @ Encounter: [id number]        Date:  8/27/2018      Assessment / Plan:      1  Follicular lymphoma stage IV diagnosed initially in 2009 with with constitutional symptoms she received single rituximab in September 2010 4 weekly doses with excellent response, by CT scan/PET scan, then she relapsed with inguinal lymphadenopathy treated with Dipti Shashi /rituximab for 4 cycles complicated with herpes zoster status post radiation therapy to that area finished in August 2013, no evidence of disease by physical examination, normal LDH or lab criteria  2  Enlarging right lower lobe pulmonary nodule identified 10/2015  Bx 11/2015 was inconclusive,  however because of the appearance she had stereotactic radiation therapy  3   Increased size and hypermetabolism of RLL lung nodule identified on pet/ct 1/2018  She is s/p SBRT right lower lobe nodule on March 30, 2018     4  Stable mild right upper cervical hypermetabolic focus, SUV 2 9 -3 4     5  Hypermetabolic activity in abdomen, SUV up to 9, 2 8cm region, 2nd area 19mm, SUV 5 3;  3rd region, 1cm, SUV 2 7  Of note, Evaluation of the abdomen is limited secondary to a paucity of intra-abdominal adipose tissue    6  History of hypermetabolic left cervical adenopathy identified on pet/ct 4/2017  Not identified on 1/2018 scan  Reviewed pet/ct scans dating back to 4/2017 with patient  Ct A/P recommended and ordered  Additional blood work to evaluate for inflammatory conditions ordered  F/U in 6 weeks  T/c rheumatology evaluation    Patient concerned she has Lyme disease despite reported previous negative testing             HPI:   #1  stage IV follicular lymphoma diagnosed in year of 2009 with B  symptoms received single agent rituximab in September 2010, 4 weekly doses with excellent response CT/PET scan in June 2012 showed significant decrease in lymph node size but persistent right inguinal lymphadenopathy she was treated with Treanda/rituximab for 4 cycles complicated with herpes zoster involving the right cervical, shoulder, breast area  Repeat PET scan showed persistent activity in the right inguinal area consistent with recurrent lymphoma patient received radiation therapy to that area and she is here for discussion  Radiation therapy finished in August 2013  #2  Obsessive-compulsive disorder  #3  Nicotine abuse  #4  Atherosclerosis of the Aorta  #5  Kyphosis  #6  Right upper lobe pulmonary nodule enlarging, the patient declined surgery in October 2015, biopsy was inconclusive no evidence of malignancy or lymphoma, However because of the appearance she had stereotactic radiation therapy       Interval History: She came for PET scan result which showed an enlarging with significant uptake in the right lower lobe lung nodule measuring 8 mm  7/3/2018 pet/ct:  The right lower lobe lesion has diminished in size and demonstrate significantly diminished glucose avidity  2  Small mildly hypermetabolic right axillary lymph node is unchanged  Additionally, no significant trend of either worsening or improving activity within mediastinal and hilar lymph nodes  3  New small mildly hypermetabolic right cervical lymph node and foci of increased activity within the abdomen, with SUV max of 5 3 in the right upper quadrant  Consider further evaluation with contrast-enhanced CT of the abdomen and pelvis, utilizing   both oral and IV contrast, to exclude the possibility of developing/worsening adenopathy  4  No evidence of metastatic disease in the pelvis or osseous structures  Night sweats ongoing along scalp  Drenching at times  Chronic arthritis - diffuse achiness, flu-like sx  Takes amoxicillin with improvement  CKMB 2/2018 normal   Vitamin B12 598    Fatigued ongoing  Denies any abdominal issues  Has been putting off vascular evalution    Chronic RLE discomfort  Carotid stenosis - she has been putting off having doppler  Test Results:        Labs:   Lab Results   Component Value Date    HGB 15 0 07/26/2018    HCT 43 7 07/26/2018    MCV 96 07/26/2018     07/26/2018    WBC 5 4 07/26/2018    NRBC 0 05/09/2017     Lab Results   Component Value Date     01/09/2018    K 4 5 01/09/2018    CL 99 01/09/2018    CO2 26 01/09/2018    ANIONGAP 7 05/09/2017    BUN 12 07/26/2018    CREATININE 0 81 07/26/2018    GLUCOSE 104 (H) 01/09/2018    GLUF 97 05/09/2017    CALCIUM 9 5 01/09/2018    AST 30 01/09/2018    ALT 24 01/09/2018    ALKPHOS 110 01/09/2018    PROT 7 1 01/09/2018    BILITOT 0 3 01/09/2018    EGFR >60 0 05/09/2017       Imaging: No results found  ROS:  As mentioned in HPI & Interval History otherwise 14 point ROS negative  Allergies: Allergies   Allergen Reactions    Flagyl [Metronidazole]      Other reaction(s): lips swollen/numb nose    Moxifloxacin Tachycardia    Other      Grass, trees    Penicillins Hives     Other reaction(s): Hives     Current Medications: Reviewed  PMH/FH/SH:  Reviewed      Physical Exam:    There is no height or weight on file to calculate BSA  Ht Readings from Last 3 Encounters:   05/08/18 4' 11" (1 499 m)   03/28/18 4' 11" (1 499 m)   02/14/18 4' 11" (1 499 m)        Wt Readings from Last 3 Encounters:   07/10/18 44 5 kg (98 lb)   07/03/18 44 kg (97 lb 1 6 oz)   05/08/18 45 5 kg (100 lb 6 4 oz)        Temp Readings from Last 3 Encounters:   07/10/18 98 2 °F (36 8 °C)   05/08/18 98 2 °F (36 8 °C) (Tympanic)   03/28/18 (!) 97 4 °F (36 3 °C)        BP Readings from Last 3 Encounters:   07/10/18 100/68   05/08/18 92/60   03/28/18 110/62           Physical Exam   Constitutional: She is oriented to person, place, and time  She appears well-developed and well-nourished  No distress  HENT:   Head: Normocephalic and atraumatic     Nose: Nose normal    Mouth/Throat: Oropharynx is clear and moist    Eyes: Conjunctivae and EOM are normal  Pupils are equal, round, and reactive to light  No scleral icterus  Neck: Normal range of motion  Neck supple  No tracheal deviation present  No thyromegaly present  Cardiovascular: Normal rate, regular rhythm, normal heart sounds and intact distal pulses  Exam reveals no gallop and no friction rub  No murmur heard  Pulmonary/Chest: Effort normal and breath sounds normal  No stridor  No respiratory distress  She has no wheezes  She has no rales  She exhibits no tenderness  Abdominal: Soft  Bowel sounds are normal  She exhibits no distension and no mass  There is no tenderness  There is no rebound and no guarding  Musculoskeletal: She exhibits no edema, tenderness or deformity (arthritic changes, nodules on dip hands Bilaterally)  Kyphotic posture  Lymphadenopathy:     She has no cervical adenopathy  Neurological: She is alert and oriented to person, place, and time  No cranial nerve deficit  Coordination normal    Skin: Skin is warm and dry  No rash noted  She is not diaphoretic  No erythema  No pallor  Psychiatric: Her behavior is normal  Judgment and thought content normal  She exhibits a depressed mood  tearful       ECO      Goals and Barriers:  Current Goal:   Prolong Survival from Cancer/ Have good QOL  Barriers: None  Patient's Capacity to Self Care:  Patient is able to self care      Emergency Contacts:    Miriam Marinelli [phone],

## 2018-08-27 ENCOUNTER — OFFICE VISIT (OUTPATIENT)
Dept: HEMATOLOGY ONCOLOGY | Facility: CLINIC | Age: 73
End: 2018-08-27
Payer: COMMERCIAL

## 2018-08-27 VITALS
TEMPERATURE: 98.7 F | HEIGHT: 59 IN | OXYGEN SATURATION: 95 % | DIASTOLIC BLOOD PRESSURE: 80 MMHG | RESPIRATION RATE: 18 BRPM | HEART RATE: 84 BPM | BODY MASS INDEX: 19.56 KG/M2 | SYSTOLIC BLOOD PRESSURE: 112 MMHG | WEIGHT: 97 LBS

## 2018-08-27 DIAGNOSIS — C82.80 OTHER TYPE OF FOLLICULAR LYMPHOMA, UNSPECIFIED BODY REGION (HCC): Primary | ICD-10-CM

## 2018-08-27 DIAGNOSIS — R91.1 LUNG NODULE: ICD-10-CM

## 2018-08-27 DIAGNOSIS — J32.1 FRONTAL SINUSITIS, UNSPECIFIED CHRONICITY: Primary | ICD-10-CM

## 2018-08-27 PROBLEM — M25.50 JOINT PAIN: Status: ACTIVE | Noted: 2018-08-27

## 2018-08-27 PROCEDURE — 99215 OFFICE O/P EST HI 40 MIN: CPT | Performed by: PHYSICIAN ASSISTANT

## 2018-08-27 RX ORDER — AMOXICILLIN 500 MG/1
500 CAPSULE ORAL EVERY 12 HOURS SCHEDULED
Qty: 14 CAPSULE | Refills: 0 | Status: SHIPPED | OUTPATIENT
Start: 2018-08-27 | End: 2018-09-03

## 2018-08-28 ENCOUNTER — TELEPHONE (OUTPATIENT)
Dept: HEMATOLOGY ONCOLOGY | Facility: CLINIC | Age: 73
End: 2018-08-28

## 2018-08-28 NOTE — TELEPHONE ENCOUNTER
I called Eulalia Chavez to let her know that the letter [phone] wrote was finished and I would email it to her daughter

## 2018-09-24 ENCOUNTER — DOCUMENTATION (OUTPATIENT)
Dept: INFUSION CENTER | Facility: HOSPITAL | Age: 73
End: 2018-09-24

## 2018-09-24 NOTE — SOCIAL WORK
Pt called MSW this day and explained that she had been accustomed to speaking the previous Cancer C[facility]  Pt shared her medical condition as well as her ongoing mental health issues  The pt describes many current stressors in her life at the moment and her biggest concern is that of her move  The pt is moving and unable to come up with rent for her new apartment and was requesting financial assistance  In reviewing the previous notes, it appears the pt is eligible  During the course of the conversation, the pt was requesting assistance with a dental bill  MSW explained the amount of money that could be given and encouraged the pt to consider her options  Plan is for the pt to let this MSW know how she wants to utilize this money

## 2018-10-05 ENCOUNTER — DOCUMENTATION (OUTPATIENT)
Dept: RADIATION ONCOLOGY | Facility: HOSPITAL | Age: 73
End: 2018-10-05

## 2018-10-05 NOTE — PROGRESS NOTES
Summary Note   Received a pc from the pt requesting assistance with some bills  She has had to leave her apt because of a mold issue which has caused her to incur a large bill for a security deposit  She also has some other bills for medical/dental concerns  Pt stated that a former [address] had assisted her early in 2018  Reviewed the [facility] spread sheet and discovered that Oskar Mejias assisted the pt in early March 2018 with an electric utility bill for over $400 00  Discussed this with the pt  She gave me a summary of some of her bills including a dental bill for $232 00  Discussed this with Nirmala Soares who gave her ok to pay this for the pt  Notified the pt who faxed the bill to me  Spoke with the dentist's office and requested a copy of their [address]  Sent the completed check request form to [employer] for processing for payment

## 2018-11-02 ENCOUNTER — TELEPHONE (OUTPATIENT)
Dept: HEMATOLOGY ONCOLOGY | Facility: CLINIC | Age: 73
End: 2018-11-02

## 2018-11-05 ENCOUNTER — TELEPHONE (OUTPATIENT)
Dept: HEMATOLOGY ONCOLOGY | Facility: CLINIC | Age: 73
End: 2018-11-05

## 2018-11-05 DIAGNOSIS — C82.80 LYMPHOMA, LARGE-CELL, FOLLICULAR (HCC): Primary | ICD-10-CM

## 2018-11-05 DIAGNOSIS — C85.90 LYMPHOMA, UNSPECIFIED BODY REGION, UNSPECIFIED LYMPHOMA TYPE (HCC): ICD-10-CM

## 2018-11-05 NOTE — TELEPHONE ENCOUNTER
Pt called and only wants Dr Svetlana Cordero to order the CT scan  She said the CT is supposed to be of her neck and chest  Pt stats she does not wants Mary Cruz ordering scan  Please call pt back and explain why the scan is only of the chest and not the neck as well

## 2018-11-05 NOTE — TELEPHONE ENCOUNTER
PT IS GOING TO CALL AND SET UP HER CT SCAN THAT SHE GETS BETWEEN HER PET SCANS DONE EVERY 6 MONTHS  HOWEVER, SHE IS QUESTIONING WHAT RENATA Klein ORDERED FOR HER - ONLY ABDOMEN AND PELVIS  SHOULDN'T THIS ORDER ALSO INCLUDE HER CHEST AND NECK AS THAT IS WHERE HER CA IS?  SHE IS CONCERNED AND WOULD LIKE A CALL BACK TO DISCUSS    TXS

## 2018-11-09 ENCOUNTER — TELEPHONE (OUTPATIENT)
Dept: HEMATOLOGY ONCOLOGY | Facility: CLINIC | Age: 73
End: 2018-11-09

## 2018-11-09 NOTE — TELEPHONE ENCOUNTER
Mookie Lynne called wanted to talk to Shreya wouldn't give me a reason  She hung up on me before I could help her further  Shreya was tasked

## 2018-11-10 LAB
ANA SER QL: NEGATIVE
B BURGDOR IGG+IGM SER-ACNC: <0.91 ISR (ref 0–0.9)
B BURGDOR IGM SER IA-ACNC: <0.8 INDEX (ref 0–0.79)
BUN SERPL-MCNC: 14 MG/DL (ref 8–27)
BUN/CREAT SERPL: 21 (ref 12–28)
CHLORIDE SERPL-SCNC: 99 MMOL/L (ref 96–106)
CO2 SERPL-SCNC: 25 MMOL/L (ref 20–29)
CREAT SERPL-MCNC: 0.67 MG/DL (ref 0.57–1)
CRP SERPL-MCNC: <0.3 MG/L (ref 0–4.9)
ERYTHROCYTE [SEDIMENTATION RATE] IN BLOOD BY WESTERGREN METHOD: 8 MM/HR (ref 0–40)
GLUCOSE SERPL-MCNC: 102 MG/DL (ref 65–99)
HETEROPH AB SER QL LA: NEGATIVE
POTASSIUM SERPL-SCNC: 5 MMOL/L (ref 3.5–5.2)
RHEUMATOID FACT SERPL-ACNC: <10 IU/ML (ref 0–13.9)
SL AMB EGFR AFRICAN AMERICAN: 101 ML/MIN/1.73
SL AMB EGFR NON AFRICAN AMERICAN: 87 ML/MIN/1.73
SODIUM SERPL-SCNC: 138 MMOL/L (ref 134–144)

## 2018-11-12 ENCOUNTER — HOSPITAL ENCOUNTER (OUTPATIENT)
Dept: RADIOLOGY | Age: 73
Discharge: HOME | End: 2018-11-12
Payer: COMMERCIAL

## 2018-11-12 DIAGNOSIS — C82.80 LYMPHOMA, LARGE-CELL, FOLLICULAR (HCC): ICD-10-CM

## 2018-11-12 PROCEDURE — 71260 CT THORAX DX C+: CPT

## 2018-11-12 PROCEDURE — 70491 CT SOFT TISSUE NECK W/DYE: CPT

## 2018-11-12 PROCEDURE — 74177 CT ABD & PELVIS W/CONTRAST: CPT

## 2018-11-12 RX ADMIN — IOHEXOL 100 ML: 350 INJECTION, SOLUTION INTRAVENOUS at 14:47

## 2018-11-16 ENCOUNTER — OFFICE VISIT (OUTPATIENT)
Dept: HEMATOLOGY ONCOLOGY | Facility: CLINIC | Age: 73
End: 2018-11-16
Payer: COMMERCIAL

## 2018-11-16 VITALS
WEIGHT: 94.4 LBS | RESPIRATION RATE: 16 BRPM | HEART RATE: 104 BPM | DIASTOLIC BLOOD PRESSURE: 61 MMHG | SYSTOLIC BLOOD PRESSURE: 101 MMHG | HEIGHT: 59 IN | OXYGEN SATURATION: 94 % | TEMPERATURE: 96.9 F | BODY MASS INDEX: 19.03 KG/M2

## 2018-11-16 DIAGNOSIS — C34.11 CANCER OF UPPER LOBE OF RIGHT LUNG (HCC): ICD-10-CM

## 2018-11-16 DIAGNOSIS — C82.35 GRADE 3A FOLLICULAR LYMPHOMA OF LYMPH NODES OF INGUINAL REGION (HCC): Primary | ICD-10-CM

## 2018-11-16 PROCEDURE — 99214 OFFICE O/P EST MOD 30 MIN: CPT | Performed by: INTERNAL MEDICINE

## 2018-11-16 NOTE — PROGRESS NOTES
Hematology Outpatient Follow - Up Note  Santiago Michael 68 y o  female MRN: @ Encounter: [id number]        Date:  11/16/2018        Assessment/ Plan:       history of stage IV follicular lymphoma diagnosed initially in 2009 with constitutional symptoms treated with 4 weekly doses of rituximab in September 2010, she had relapse in the inguinal lymphadenopathy she was treated with bendamustine/rituximab for 4 cycles complicated with herpes zoster  PET scan showed residual disease she received radiation therapy to that area in August 2013     Now CT scan showed peripancreatic lymph node might be secondary to residual lymphoma, will watch and observe and repeat PET scan in 6 months     Right lower lobe pulmonary nodule biopsy was inconclusive in 11/2015, increase in size status post stereotactic radiation therapy in March 2018, it got smaller to 6 mm    Follow-up in 6 months with CBC, CMP, LDH          HPI:      she is 68-year-old  female with history of obsessive-compulsive disorder and history of stage IV follicular lymphoma diagnosed in 2009 with constitutional symptoms, she received single agent rituximab in September 2010 every week x4 with excellent response, CT/ PET scan in June 2012 showed significant decrease in the lymph nodes size but persistent right inguinal lymphadenopathy  She was treated with bendamustine/rituximab for 4 cycles complicated with herpes zoster involving the shoulder, right cervical, breast area repeat PET scan showed persistent activity in the right inguinal area consistent with recurrent lymphoma, she received radiation therapy to that area finished in August 2013     She had a history of nicotine abuse    Right upper lobe pulmonary nodule she declined surgery in October 2015 biopsy was inconclusive, however because of the appearance and high nicotine abuse, the patient received stereotactic radiation therapy to that area in March 2018     persistent hypermetabolic activity in the abdomen in the area peripancreatic and para-aortic    Interval History:        Previous Treatment:         Test Results:    Imaging: Ct Soft Tissue Neck W Contrast    Result Date: 11/13/2018  Narrative: CT NECK WITH CONTRAST INDICATION:   C82 80: Other types of follicular lymphoma, unspecified site  COMPARISON:  Neck CT 10/20/2017 and 3/24/ 2017 TECHNIQUE:  Axial, sagittal, and coronal 2D reformatted images were created from the axial source data and submitted for interpretation  Radiation dose length product (DLP) for this visit:  205 mGy-cm   This examination, like all CT scans performed in the [facility], was performed utilizing techniques to minimize radiation dose exposure, including the use of iterative reconstruction and automated exposure control  IV Contrast:  100 mL of iohexol (OMNIPAQUE) IMAGE QUALITY:  Diagnostic  FINDINGS: VISUALIZED BRAIN PARENCHYMA:  No acute intracranial pathology of the visualized brain parenchyma  VISUALIZED ORBITS AND PARANASAL SINUSES:  Visualized paranasal sinuses are clear NASAL CAVITY AND NASOPHARYNX:  Normal  SUPRAHYOID NECK:  Normal oral cavity, tongue base, tonsillar fossa and epiglottis  INFRAHYOID NECK:  Aryepiglottic folds and piriform sinuses are normal   Normal glottis and subglottic airway  THYROID GLAND:  Redemonstration of changes from prior right hemithyroidectomy  Grossly unchanged multiple subcentimeter hypoattenuating nodules and foci of calcifications in the left thyroid lobe  PAROTID AND SUBMANDIBULAR GLANDS:  Normal  LYMPH NODES:  There is no pathologically enlarged cervical lymphadenopathy by size criteria VASCULAR STRUCTURES:  There is mild atherosclerotic disease involving bilateral carotid bifurcation without significant stenosis  THORACIC INLET:  Emphysematous changes in partially visualized lung apices BONY STRUCTURES: Degenerative changes of cervical spine more pronounced at levels C5-6 and C6-7    No acute or aggressive appearing osseous abnormality  Impression: 1  Compared to CT 10/20/2017, stable study without pathologically enlarged cervical lymphadenopathy  2   Stable postsurgical changes of right hemithyroidectomy and unchanged multiple subcentimeter nodules and foci of calcifications in the left thyroid lobe  3   Pulmonary emphysema in visualized lung apices  Workstation performed: [id number]     Ct Chest Abdomen Pelvis W Contrast    Result Date: 11/13/2018  Narrative: CT CHEST, ABDOMEN AND PELVIS WITH IV CONTRAST INDICATION:   C82 80: Other types of follicular lymphoma, unspecified site  Follow-up of stage IV follicular lymphoma initially diagnosed in 2009  COMPARISON:  CT chest 10/20/2017  PET CT 7/3/2018  TECHNIQUE: CT examination of the chest, abdomen and pelvis was performed  CT chest on pelvis 2/12/2016  Axial, sagittal, and coronal 2D reformatted images were created from the source data and submitted for interpretation  Radiation dose length product (DLP) for this visit:  301 mGy-cm   This examination, like all CT scans performed in the [facility], was performed utilizing techniques to minimize radiation dose exposure, including the use of iterative reconstruction and automated exposure control  IV Contrast:  100 mL of iohexol (OMNIPAQUE) Enteric Contrast: Enteric contrast was administered  FINDINGS: CHEST LUNGS:  The previously FDG avid right lower lobe lesion is decreased in size now measuring 5 mm in diameter  This measured 6 x 6 mm on the most recent PET/CT  No new pulmonary lesions  Stable background emphysema  PLEURA:  Unremarkable  HEART/GREAT VESSELS:  Atherosclerotic changes of the aorta  No dissection or aneurysm MEDIASTINUM AND MEKHI:  Unremarkable  CHEST WALL AND LOWER NECK:   The previously seen FDG avid right axillary node measures 5 mm in short axis and is unchanged from the prior study  Thyroidectomy   ABDOMEN LIVER/BILIARY TREE:  Extrahepatic biliary dilation stable from the prior study and likely of a normal degree status post cholecystectomy  GALLBLADDER:  Gallbladder is surgically absent  SPLEEN:  Unremarkable  PANCREAS:  Unremarkable  ADRENAL GLANDS:  Unremarkable  KIDNEYS/URETERS:  Unremarkable  No hydronephrosis  STOMACH AND BOWEL:  Unremarkable  APPENDIX:  Colonic diverticulosis  No diverticulitis  Prominent colonic stool  No bowel obstruction  ABDOMINOPELVIC CAVITY:  No ascites or free intraperitoneal air  Central mesenteric lymph node measuring 15 x 11 mm; #4/72  Peripancreatic node measuring 25 x 15 mm; #4/70  These may correlate to the areas of right upper quadrant FDG uptake seen on the  PET scan  VESSELS:  Unremarkable for patient's age  PELVIS REPRODUCTIVE ORGANS:  Unremarkable for patient's age  URINARY BLADDER:  Unremarkable  ABDOMINAL WALL/INGUINAL REGIONS:  Unremarkable  OSSEOUS STRUCTURES:  No acute fracture or destructive osseous lesion  Chronic L3 compression deformity  Impression: Decreasing size of the FDG avid right lower lobe pulmonary nodule which measured 6 x 6 mm on the most recent PET scan and now measures 5 mm in greatest diameter  Right axillary node which is also FDG avid on the recent PET scan measures 5 mm in short axis  Central mesenteric lymph node measuring 15 x 11 mm; #4/72  Peripancreatic node measuring 25 x 15 mm; #4/70  These may correlate to the areas of right upper quadrant FDG uptake seen on the PET scan   Workstation performed: [id number]       Labs:   Lab Results   Component Value Date    WBC 5 4 07/26/2018    HGB 15 0 07/26/2018    HCT 43 7 07/26/2018    MCV 96 07/26/2018     07/26/2018     Lab Results   Component Value Date     01/09/2018    K 5 0 11/09/2018    CL 99 11/09/2018    CO2 25 11/09/2018    ANIONGAP 6 11/05/2015    BUN 14 11/09/2018    CREATININE 0 74 01/09/2018    GLUCOSE 104 (H) 01/09/2018    GLUF 97 05/09/2017    CALCIUM 9 5 01/09/2018    AST 30 01/09/2018    ALT 24 01/09/2018    ALKPHOS 110 01/09/2018 PROT 7 1 01/09/2018    BILITOT 0 3 01/09/2018    EGFR >60 0 05/09/2017       No results found for: IRON, TIBC, FERRITIN    Lab Results   Component Value Date    [id number] 598 02/16/2018         ROS:   Review of Systems   Constitutional: Negative for activity change, appetite change, diaphoresis, fatigue, fever and unexpected weight change  HENT: Negative for facial swelling, hearing loss, rhinorrhea, sinus pain, sinus pressure, sneezing, sore throat and tinnitus  Eyes: Negative for photophobia, pain, discharge, redness, itching and visual disturbance  Respiratory: Positive for cough and shortness of breath  Negative for apnea and chest tightness  Cardiovascular: Negative for chest pain, palpitations and leg swelling  Gastrointestinal: Negative for abdominal distention, abdominal pain, blood in stool, constipation, diarrhea, nausea, rectal pain and vomiting  Endocrine: Negative for cold intolerance, heat intolerance, polydipsia and polyphagia  Genitourinary: Negative for difficulty urinating, dyspareunia, frequency, hematuria, pelvic pain and urgency  Musculoskeletal: Negative for arthralgias, back pain, gait problem, joint swelling and myalgias  Skin: Negative for color change, pallor and rash  Allergic/Immunologic: Negative for environmental allergies and food allergies  Neurological: Positive for weakness  Negative for dizziness, tremors, seizures, syncope, speech difficulty, numbness and headaches  Hematological: Negative for adenopathy  Does not bruise/bleed easily  Psychiatric/Behavioral: Negative for agitation, confusion, dysphoric mood, hallucinations and suicidal ideas  Current Medications: Reviewed  Allergies: Reviewed  PMH/FH/SH:  Reviewed      Physical Exam:    Body surface area is 1 34 meters squared      Wt Readings from Last 3 Encounters:   11/16/18 42 8 kg (94 lb 6 4 oz)   08/27/18 44 kg (97 lb)   07/10/18 44 5 kg (98 lb)        Temp Readings from Last 3 Encounters: 11/16/18 (!) 96 9 °F (36 1 °C) (Tympanic)   08/27/18 98 7 °F (37 1 °C) (Tympanic)   07/10/18 98 2 °F (36 8 °C)        BP Readings from Last 3 Encounters:   11/16/18 101/61   08/27/18 112/80   07/10/18 100/68         Pulse Readings from Last 3 Encounters:   11/16/18 104   08/27/18 84   07/10/18 71        Physical Exam   Constitutional: She is oriented to person, place, and time  She appears well-developed and well-nourished  No distress  HENT:   Head: Normocephalic and atraumatic  Mouth/Throat: Oropharynx is clear and moist  No oropharyngeal exudate  Eyes: Pupils are equal, round, and reactive to light  Conjunctivae and EOM are normal    Neck: Normal range of motion  Neck supple  No JVD present  No tracheal deviation present  No thyromegaly present  Cardiovascular: Normal rate and regular rhythm  Exam reveals no gallop and no friction rub  No murmur heard  Pulmonary/Chest: Effort normal and breath sounds normal  No respiratory distress  She has no wheezes  She has no rales  She exhibits no tenderness  Abdominal: Soft  Bowel sounds are normal  She exhibits no distension and no mass  There is no tenderness  There is no rebound and no guarding  Musculoskeletal: Normal range of motion  She exhibits no edema  kyphosis   Lymphadenopathy:     She has no cervical adenopathy  Neurological: She is alert and oriented to person, place, and time  Skin: Skin is warm and dry  No rash noted  She is not diaphoretic  No erythema  No pallor  Psychiatric: She has a normal mood and affect  Her behavior is normal  Judgment and thought content normal    Vitals reviewed  Goals and Barriers:  Current Goal: Minimize effects of disease  Barriers: None  Patient's Capacity to Self Care:  Patient is able to self care      Code Status: [unfilled]

## 2018-11-16 NOTE — LETTER
November 16, 2018     Basilia Tirado, [address]  [address] [facility]  [address]    Patient: Foster Vicente   YOB: 1945   Date of Visit: 11/16/2018       Dear Dr Varsha Patiño:    Thank you for referring Raul Montemayor to me for evaluation  Below are my notes for this consultation  If you have questions, please do not hesitate to call me  I look forward to following your patient along with you           Sincerely,        Greg Benitez MD        CC: MD Greg Gorman MD  11/16/2018 12:13 PM  Sign at close encounter  Hematology Outpatient Follow - Up Note  Foster Vicente 68 y o  female MRN: @ Encounter: [id number]        Date:  11/16/2018        Assessment/ Plan:       history of stage IV follicular lymphoma diagnosed initially in 2009 with constitutional symptoms treated with 4 weekly doses of rituximab in September 2010, she had relapse in the inguinal lymphadenopathy she was treated with bendamustine/rituximab for 4 cycles complicated with herpes zoster  PET scan showed residual disease she received radiation therapy to that area in August 2013     Now CT scan showed peripancreatic lymph node might be secondary to residual lymphoma, will watch and observe and repeat PET scan in 6 months     Right lower lobe pulmonary nodule biopsy was inconclusive in 11/2015, increase in size status post stereotactic radiation therapy in March 2018, it got smaller to 6 mm    Follow-up in 6 months with CBC, CMP, LDH          HPI:      she is 70-year-old  female with history of obsessive-compulsive disorder and history of stage IV follicular lymphoma diagnosed in 2009 with constitutional symptoms, she received single agent rituximab in September 2010 every week x4 with excellent response, CT/ PET scan in June 2012 showed significant decrease in the lymph nodes size but persistent right inguinal lymphadenopathy  She was treated with bendamustine/rituximab for 4 cycles complicated with herpes zoster involving the shoulder, right cervical, breast area repeat PET scan showed persistent activity in the right inguinal area consistent with recurrent lymphoma, she received radiation therapy to that area finished in August 2013     She had a history of nicotine abuse    Right upper lobe pulmonary nodule she declined surgery in October 2015 biopsy was inconclusive, however because of the appearance and high nicotine abuse, the patient received stereotactic radiation therapy to that area in March 2018     persistent hypermetabolic activity in the abdomen in the area peripancreatic and para-aortic    Interval History:        Previous Treatment:         Test Results:    Imaging: Ct Soft Tissue Neck W Contrast    Result Date: 11/13/2018  Narrative: CT NECK WITH CONTRAST INDICATION:   C82 80: Other types of follicular lymphoma, unspecified site  COMPARISON:  Neck CT 10/20/2017 and 3/24/ 2017 TECHNIQUE:  Axial, sagittal, and coronal 2D reformatted images were created from the axial source data and submitted for interpretation  Radiation dose length product (DLP) for this visit:  205 mGy-cm   This examination, like all CT scans performed in the [facility], was performed utilizing techniques to minimize radiation dose exposure, including the use of iterative reconstruction and automated exposure control  IV Contrast:  100 mL of iohexol (OMNIPAQUE) IMAGE QUALITY:  Diagnostic  FINDINGS: VISUALIZED BRAIN PARENCHYMA:  No acute intracranial pathology of the visualized brain parenchyma  VISUALIZED ORBITS AND PARANASAL SINUSES:  Visualized paranasal sinuses are clear NASAL CAVITY AND NASOPHARYNX:  Normal  SUPRAHYOID NECK:  Normal oral cavity, tongue base, tonsillar fossa and epiglottis  INFRAHYOID NECK:  Aryepiglottic folds and piriform sinuses are normal   Normal glottis and subglottic airway  THYROID GLAND:  Redemonstration of changes from prior right hemithyroidectomy    Grossly unchanged multiple subcentimeter hypoattenuating nodules and foci of calcifications in the left thyroid lobe  PAROTID AND SUBMANDIBULAR GLANDS:  Normal  LYMPH NODES:  There is no pathologically enlarged cervical lymphadenopathy by size criteria VASCULAR STRUCTURES:  There is mild atherosclerotic disease involving bilateral carotid bifurcation without significant stenosis  THORACIC INLET:  Emphysematous changes in partially visualized lung apices BONY STRUCTURES: Degenerative changes of cervical spine more pronounced at levels C5-6 and C6-7  No acute or aggressive appearing osseous abnormality  Impression: 1  Compared to CT 10/20/2017, stable study without pathologically enlarged cervical lymphadenopathy  2   Stable postsurgical changes of right hemithyroidectomy and unchanged multiple subcentimeter nodules and foci of calcifications in the left thyroid lobe  3   Pulmonary emphysema in visualized lung apices  Workstation performed: [id number]     Ct Chest Abdomen Pelvis W Contrast    Result Date: 11/13/2018  Narrative: CT CHEST, ABDOMEN AND PELVIS WITH IV CONTRAST INDICATION:   C82 80: Other types of follicular lymphoma, unspecified site  Follow-up of stage IV follicular lymphoma initially diagnosed in 2009  COMPARISON:  CT chest 10/20/2017  PET CT 7/3/2018  TECHNIQUE: CT examination of the chest, abdomen and pelvis was performed  CT chest on pelvis 2/12/2016  Axial, sagittal, and coronal 2D reformatted images were created from the source data and submitted for interpretation  Radiation dose length product (DLP) for this visit:  301 mGy-cm   This examination, like all CT scans performed in the [facility], was performed utilizing techniques to minimize radiation dose exposure, including the use of iterative reconstruction and automated exposure control  IV Contrast:  100 mL of iohexol (OMNIPAQUE) Enteric Contrast: Enteric contrast was administered   FINDINGS: CHEST LUNGS:  The previously FDG avid right lower lobe lesion is decreased in size now measuring 5 mm in diameter  This measured 6 x 6 mm on the most recent PET/CT  No new pulmonary lesions  Stable background emphysema  PLEURA:  Unremarkable  HEART/GREAT VESSELS:  Atherosclerotic changes of the aorta  No dissection or aneurysm MEDIASTINUM AND MEKHI:  Unremarkable  CHEST WALL AND LOWER NECK:   The previously seen FDG avid right axillary node measures 5 mm in short axis and is unchanged from the prior study  Thyroidectomy  ABDOMEN LIVER/BILIARY TREE:  Extrahepatic biliary dilation stable from the prior study and likely of a normal degree status post cholecystectomy  GALLBLADDER:  Gallbladder is surgically absent  SPLEEN:  Unremarkable  PANCREAS:  Unremarkable  ADRENAL GLANDS:  Unremarkable  KIDNEYS/URETERS:  Unremarkable  No hydronephrosis  STOMACH AND BOWEL:  Unremarkable  APPENDIX:  Colonic diverticulosis  No diverticulitis  Prominent colonic stool  No bowel obstruction  ABDOMINOPELVIC CAVITY:  No ascites or free intraperitoneal air  Central mesenteric lymph node measuring 15 x 11 mm; #4/72  Peripancreatic node measuring 25 x 15 mm; #4/70  These may correlate to the areas of right upper quadrant FDG uptake seen on the  PET scan  VESSELS:  Unremarkable for patient's age  PELVIS REPRODUCTIVE ORGANS:  Unremarkable for patient's age  URINARY BLADDER:  Unremarkable  ABDOMINAL WALL/INGUINAL REGIONS:  Unremarkable  OSSEOUS STRUCTURES:  No acute fracture or destructive osseous lesion  Chronic L3 compression deformity  Impression: Decreasing size of the FDG avid right lower lobe pulmonary nodule which measured 6 x 6 mm on the most recent PET scan and now measures 5 mm in greatest diameter  Right axillary node which is also FDG avid on the recent PET scan measures 5 mm in short axis  Central mesenteric lymph node measuring 15 x 11 mm; #4/72  Peripancreatic node measuring 25 x 15 mm; #4/70    These may correlate to the areas of right upper quadrant FDG uptake seen on the PET scan  Workstation performed: [id number]       Labs:   Lab Results   Component Value Date    WBC 5 4 07/26/2018    HGB 15 0 07/26/2018    HCT 43 7 07/26/2018    MCV 96 07/26/2018     07/26/2018     Lab Results   Component Value Date     01/09/2018    K 5 0 11/09/2018    CL 99 11/09/2018    CO2 25 11/09/2018    ANIONGAP 6 11/05/2015    BUN 14 11/09/2018    CREATININE 0 74 01/09/2018    GLUCOSE 104 (H) 01/09/2018    GLUF 97 05/09/2017    CALCIUM 9 5 01/09/2018    AST 30 01/09/2018    ALT 24 01/09/2018    ALKPHOS 110 01/09/2018    PROT 7 1 01/09/2018    BILITOT 0 3 01/09/2018    EGFR >60 0 05/09/2017       No results found for: IRON, TIBC, FERRITIN    Lab Results   Component Value Date    [id number] 598 02/16/2018         ROS:   Review of Systems   Constitutional: Negative for activity change, appetite change, diaphoresis, fatigue, fever and unexpected weight change  HENT: Negative for facial swelling, hearing loss, rhinorrhea, sinus pain, sinus pressure, sneezing, sore throat and tinnitus  Eyes: Negative for photophobia, pain, discharge, redness, itching and visual disturbance  Respiratory: Positive for cough and shortness of breath  Negative for apnea and chest tightness  Cardiovascular: Negative for chest pain, palpitations and leg swelling  Gastrointestinal: Negative for abdominal distention, abdominal pain, blood in stool, constipation, diarrhea, nausea, rectal pain and vomiting  Endocrine: Negative for cold intolerance, heat intolerance, polydipsia and polyphagia  Genitourinary: Negative for difficulty urinating, dyspareunia, frequency, hematuria, pelvic pain and urgency  Musculoskeletal: Negative for arthralgias, back pain, gait problem, joint swelling and myalgias  Skin: Negative for color change, pallor and rash  Allergic/Immunologic: Negative for environmental allergies and food allergies  Neurological: Positive for weakness   Negative for dizziness, tremors, seizures, syncope, speech difficulty, numbness and headaches  Hematological: Negative for adenopathy  Does not bruise/bleed easily  Psychiatric/Behavioral: Negative for agitation, confusion, dysphoric mood, hallucinations and suicidal ideas  Current Medications: Reviewed  Allergies: Reviewed  PMH/FH/SH:  Reviewed      Physical Exam:    Body surface area is 1 34 meters squared  Wt Readings from Last 3 Encounters:   11/16/18 42 8 kg (94 lb 6 4 oz)   08/27/18 44 kg (97 lb)   07/10/18 44 5 kg (98 lb)        Temp Readings from Last 3 Encounters:   11/16/18 (!) 96 9 °F (36 1 °C) (Tympanic)   08/27/18 98 7 °F (37 1 °C) (Tympanic)   07/10/18 98 2 °F (36 8 °C)        BP Readings from Last 3 Encounters:   11/16/18 101/61   08/27/18 112/80   07/10/18 100/68         Pulse Readings from Last 3 Encounters:   11/16/18 104   08/27/18 84   07/10/18 71        Physical Exam   Constitutional: She is oriented to person, place, and time  She appears well-developed and well-nourished  No distress  HENT:   Head: Normocephalic and atraumatic  Mouth/Throat: Oropharynx is clear and moist  No oropharyngeal exudate  Eyes: Pupils are equal, round, and reactive to light  Conjunctivae and EOM are normal    Neck: Normal range of motion  Neck supple  No JVD present  No tracheal deviation present  No thyromegaly present  Cardiovascular: Normal rate and regular rhythm  Exam reveals no gallop and no friction rub  No murmur heard  Pulmonary/Chest: Effort normal and breath sounds normal  No respiratory distress  She has no wheezes  She has no rales  She exhibits no tenderness  Abdominal: Soft  Bowel sounds are normal  She exhibits no distension and no mass  There is no tenderness  There is no rebound and no guarding  Musculoskeletal: Normal range of motion  She exhibits no edema  kyphosis   Lymphadenopathy:     She has no cervical adenopathy  Neurological: She is alert and oriented to person, place, and time     Skin: Skin is warm and dry  No rash noted  She is not diaphoretic  No erythema  No pallor  Psychiatric: She has a normal mood and affect  Her behavior is normal  Judgment and thought content normal    Vitals reviewed  Goals and Barriers:  Current Goal: Minimize effects of disease  Barriers: None  Patient's Capacity to Self Care:  Patient is able to self care      Code Status: @[facility]@

## 2018-12-19 ENCOUNTER — TELEPHONE (OUTPATIENT)
Dept: HEMATOLOGY ONCOLOGY | Facility: CLINIC | Age: 73
End: 2018-12-19

## 2018-12-19 NOTE — TELEPHONE ENCOUNTER
Called patient to confirm the dosing of the amoxicillin that Dr Vickey Abreu usually gives her   Patient is requesting due to a sinus infection

## 2018-12-19 NOTE — TELEPHONE ENCOUNTER
Patient called and asked to talk to RN [facility] or RN [id number]  I tried to help her but she won't let me  She insisted to have a nurse calling her back  Could you please help with this? Thank You!

## 2018-12-20 DIAGNOSIS — J32.9 SINUSITIS, UNSPECIFIED CHRONICITY, UNSPECIFIED LOCATION: Primary | ICD-10-CM

## 2018-12-20 RX ORDER — AMOXICILLIN 500 MG/1
500 CAPSULE ORAL EVERY 8 HOURS SCHEDULED
Qty: 42 CAPSULE | Refills: 0 | Status: SHIPPED | OUTPATIENT
Start: 2018-12-20 | End: 2019-01-03

## 2019-01-04 ENCOUNTER — TRANSCRIBE ORDERS (OUTPATIENT)
Dept: ADMINISTRATIVE | Facility: HOSPITAL | Age: 74
End: 2019-01-04

## 2019-01-04 DIAGNOSIS — I65.22 OCCLUSION OF LEFT CAROTID ARTERY: Primary | ICD-10-CM

## 2019-01-04 DIAGNOSIS — E04.1 THYROID NODULE: Primary | ICD-10-CM

## 2019-02-21 ENCOUNTER — OFFICE VISIT (OUTPATIENT)
Dept: FAMILY MEDICINE CLINIC | Facility: CLINIC | Age: 74
End: 2019-02-21
Payer: COMMERCIAL

## 2019-02-21 VITALS — BODY MASS INDEX: 19.35 KG/M2 | HEIGHT: 59 IN | WEIGHT: 96 LBS | RESPIRATION RATE: 16 BRPM

## 2019-02-21 DIAGNOSIS — J01.00 ACUTE NON-RECURRENT MAXILLARY SINUSITIS: Primary | ICD-10-CM

## 2019-02-21 DIAGNOSIS — B35.1 ONYCHOMYCOSIS OF TOENAIL: ICD-10-CM

## 2019-02-21 PROCEDURE — 3008F BODY MASS INDEX DOCD: CPT | Performed by: FAMILY MEDICINE

## 2019-02-21 PROCEDURE — 1160F RVW MEDS BY RX/DR IN RCRD: CPT | Performed by: FAMILY MEDICINE

## 2019-02-21 PROCEDURE — 99214 OFFICE O/P EST MOD 30 MIN: CPT | Performed by: FAMILY MEDICINE

## 2019-02-21 RX ORDER — KETOCONAZOLE 20 MG/G
CREAM TOPICAL DAILY
Qty: 15 G | Refills: 0 | Status: SHIPPED | OUTPATIENT
Start: 2019-02-21

## 2019-02-21 RX ORDER — FLUTICASONE PROPIONATE 50 MCG
1 SPRAY, SUSPENSION (ML) NASAL DAILY
Qty: 1 BOTTLE | Refills: 1 | Status: SHIPPED | OUTPATIENT
Start: 2019-02-21 | End: 2019-03-13

## 2019-02-21 RX ORDER — AMOXICILLIN AND CLAVULANATE POTASSIUM 875; 125 MG/1; MG/1
1 TABLET, FILM COATED ORAL EVERY 12 HOURS SCHEDULED
Qty: 10 TABLET | Refills: 0 | Status: SHIPPED | OUTPATIENT
Start: 2019-02-21 | End: 2019-02-26

## 2019-02-21 RX ORDER — TERBINAFINE HYDROCHLORIDE 250 MG/1
250 TABLET ORAL DAILY
Qty: 30 TABLET | Refills: 2 | Status: SHIPPED | OUTPATIENT
Start: 2019-02-21 | End: 2019-03-13

## 2019-02-21 NOTE — PROGRESS NOTES
Assessment/Plan:    No problem-specific Assessment & Plan notes found for this encounter  Diagnoses and all orders for this visit:    Acute non-recurrent maxillary sinusitis  After discussing risks and benefits of medication along with side effects will start Augmentin at this time along with Flonase   -     amoxicillin-clavulanate (AUGMENTIN) 875-125 mg per tablet; Take 1 tablet by mouth every 12 (twelve) hours for 5 days  -     fluticasone (FLONASE) 50 mcg/act nasal spray; 1 spray into each nostril daily    Onychomycosis of toenail  After discussing risks and benefits of medication along with side effects she was given ketoconazole topical and then also terbinafine oral to be started only after she checks her liver enzymes  -     terbinafine (LamISIL) 250 mg tablet; Take 1 tablet (250 mg total) by mouth daily for 30 days  -     ketoconazole (NIZORAL) 2 % cream; Apply topically daily  -     Comprehensive metabolic panel; Future      Follow up in 1 month or as needed    Subjective:      Patient ID: Darien Hanson is a 68 y o  female  Sinus Pain  Patient complains of clear rhinorrhea, congestion, cough, nasal congestion, post nasal drip, sinus pressure and sneezing  Onset of symptoms was a few days ago  Symptoms have been unchanged since that time  She is drinking plenty of fluids  Past history is significant for nothing  Patient is smoker  Also she is here because she has noticed some itching reddness and scaling between her 4th and 5th toe of her right foot  The following portions of the patient's history were reviewed and updated as appropriate:   She  has a past medical history of Carotid artery stenosis, Chronic pain, GERD (gastroesophageal reflux disease), History of chemotherapy, Lymphoma ([facility] [address] ), OCD (obsessive compulsive disorder), Peptic ulcer, and Psychiatric disorder    She   Patient Active Problem List    Diagnosis Date Noted    Acute non-recurrent maxillary sinusitis 02/21/2019    Onychomycosis of toenail 02/21/2019    Joint pain 08/27/2018    Frontal sinusitis 08/27/2018    Vascular disease, peripheral ([place] [address] ) 03/28/2018    Toe pain, right 03/28/2018    Cancer of lower lobe of right lung ([place] [address] ) 02/09/2018    Lymphoma ([place] [address] ) 01/29/2018    Incidental lung nodule, greater than or equal to 8mm 01/29/2018    Bilateral hip pain 12/26/2017    Change in mole 12/26/2017    Chronic lumbar pain 12/26/2017    Cancer of upper lobe of right lung ([place] [address] ) 07/27/2016    PTSD (post-traumatic stress disorder) 07/22/2016    Atherosclerosis of native artery of extremity ([place] [address] ) 05/11/2016    Nicotine dependence 05/11/2016    COPD exacerbation ([place] [address] ) 02/23/2016    Lung nodule 04/15/2015    Inguinal pain 03/09/2015    Tobacco use disorder 09/23/2014    Anxiety 08/21/2014    Scoliosis 05/20/2014    Carotid artery stenosis 02/10/2014    Irritable bowel syndrome 47/00/6028    Follicular lymphoma ([place] [address] ) 04/30/2013    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    Osteoarthrosis 02/02/2010    Scoliosis (and kyphoscoliosis), idiopathic 02/02/2010     She  has a past surgical history that includes Lung biopsy; Cholecystectomy; Appendectomy; Thyroidectomy, partial; Gallbladder surgery; and Thoracotomy (2015)  Her family history includes Cancer in her maternal grandfather and other; Diabetes in her mother; Heart disease in her brother and mother  She  reports that she has been smoking cigarettes  She has been smoking about 0 50 packs per day  She has never used smokeless tobacco  She reports that she does not drink alcohol or use drugs    Current Outpatient Medications   Medication Sig Dispense Refill    omeprazole (PriLOSEC) 40 MG capsule Take 1 capsule by mouth daily      amoxicillin-clavulanate (AUGMENTIN) 875-125 mg per tablet Take 1 tablet by mouth every 12 (twelve) hours for 5 days 10 tablet 0    fluticasone (FLONASE) 50 mcg/act nasal spray 1 spray into each nostril daily 1 Bottle 1    ketoconazole (NIZORAL) 2 % cream Apply topically daily 15 g 0    terbinafine (LamISIL) 250 mg tablet Take 1 tablet (250 mg total) by mouth daily for 30 days 30 tablet 2     No current facility-administered medications for this visit  Current Outpatient Medications on File Prior to Visit   Medication Sig    omeprazole (PriLOSEC) 40 MG capsule Take 1 capsule by mouth daily     No current facility-administered medications on file prior to visit  She is allergic to flagyl [metronidazole]; moxifloxacin; other; and penicillins       Review of Systems   Constitutional: Negative for activity change, appetite change, fatigue and fever  HENT: Positive for congestion, postnasal drip, rhinorrhea, sinus pain and sore throat  Negative for ear discharge  Respiratory: Positive for cough and shortness of breath  Cardiovascular: Negative for chest pain and palpitations  Gastrointestinal: Negative for diarrhea and nausea  Musculoskeletal: Negative for arthralgias and back pain  Skin: Positive for rash  Negative for color change  Neurological: Negative for dizziness and headaches  Psychiatric/Behavioral: Negative for agitation and behavioral problems  Objective:      Resp 16   Ht 4' 11" (1 499 m)   Wt 43 5 kg (96 lb)   BMI 19 39 kg/m²          Physical Exam   Constitutional: She is oriented to person, place, and time  She appears well-developed and well-nourished  No distress  HENT:   Head: Normocephalic and atraumatic  Nose: Nose normal    Mouth/Throat: Oropharynx is clear and moist    Eyes: Pupils are equal, round, and reactive to light  Conjunctivae are normal    Cardiovascular: Normal rate, regular rhythm and normal heart sounds  No murmur heard  Pulmonary/Chest: Effort normal and breath sounds normal  No respiratory distress  She has no wheezes  Abdominal: Soft  Bowel sounds are normal  She exhibits no distension  There is no tenderness     Neurological: She is alert and oriented to person, place, and time  Skin: Skin is warm and dry  No rash noted  She is not diaphoretic  No erythema  Onychomycosis noted on 1st toe right foot  Scaling, redness and itching noted between 4th and 5th toe of right foot   Psychiatric: She has a normal mood and affect

## 2019-02-22 ENCOUNTER — TELEPHONE (OUTPATIENT)
Dept: FAMILY MEDICINE CLINIC | Facility: CLINIC | Age: 74
End: 2019-02-22

## 2019-02-22 DIAGNOSIS — J32.1 FRONTAL SINUSITIS, UNSPECIFIED CHRONICITY: Primary | ICD-10-CM

## 2019-02-22 RX ORDER — CEPHALEXIN 500 MG/1
500 CAPSULE ORAL EVERY 6 HOURS SCHEDULED
Qty: 20 CAPSULE | Refills: 0 | Status: SHIPPED | OUTPATIENT
Start: 2019-02-22 | End: 2019-02-27

## 2019-02-22 NOTE — TELEPHONE ENCOUNTER
Patient feels awful, dizzy and has a headache on the Augmentin and would like something new sent into [facility]

## 2019-03-01 ENCOUNTER — TELEPHONE (OUTPATIENT)
Dept: FAMILY MEDICINE CLINIC | Facility: CLINIC | Age: 74
End: 2019-03-01

## 2019-03-01 DIAGNOSIS — J32.1 FRONTAL SINUSITIS, UNSPECIFIED CHRONICITY: Primary | ICD-10-CM

## 2019-03-01 RX ORDER — CEPHALEXIN 500 MG/1
500 CAPSULE ORAL EVERY 8 HOURS SCHEDULED
Qty: 21 CAPSULE | Refills: 0 | Status: SHIPPED | OUTPATIENT
Start: 2019-03-01 | End: 2019-03-08

## 2019-03-01 NOTE — TELEPHONE ENCOUNTER
Patient called office and stated that she was seen by you on 2/22/19 and rx Keflex x 5 days  Patient finished ABX but still having productive cough with yellow sputum - sinus pressure , ear ache , congestion - nasal drainage but clear  Patient would like to know if she should be on the ABX again but longer then 5 days?

## 2019-03-13 ENCOUNTER — OFFICE VISIT (OUTPATIENT)
Dept: HEMATOLOGY ONCOLOGY | Facility: CLINIC | Age: 74
End: 2019-03-13
Payer: COMMERCIAL

## 2019-03-13 VITALS
TEMPERATURE: 97.3 F | RESPIRATION RATE: 16 BRPM | OXYGEN SATURATION: 99 % | SYSTOLIC BLOOD PRESSURE: 104 MMHG | WEIGHT: 96.2 LBS | HEART RATE: 77 BPM | BODY MASS INDEX: 19.4 KG/M2 | DIASTOLIC BLOOD PRESSURE: 61 MMHG | HEIGHT: 59 IN

## 2019-03-13 DIAGNOSIS — C34.11 CANCER OF UPPER LOBE OF RIGHT LUNG (HCC): ICD-10-CM

## 2019-03-13 DIAGNOSIS — C82.35 GRADE 3A FOLLICULAR LYMPHOMA OF LYMPH NODES OF INGUINAL REGION (HCC): Primary | ICD-10-CM

## 2019-03-13 PROCEDURE — 99214 OFFICE O/P EST MOD 30 MIN: CPT | Performed by: INTERNAL MEDICINE

## 2019-03-13 NOTE — PROGRESS NOTES
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 diagnosed in 2009, she received single agent rituximab in September 2010 every week x4 with excellent response, she had progression of disease in June 2012 treated with bendamustine/rituximab complicated with herpes zoster involving the shoulder, right cervical area and breast area    She received radiation therapy to the right inguinal area    She had right upper lobe lung mass treated with radiation therapy after the biopsy was inconclusive but high suspicion for lung cancer    Now with lymph node measuring 1 x 1 cm in the right axillary area, physical examination showed possible a mass in the abdomen as well    She needs CT/PET scan    I will order LDH,    CBC, CMP was reported normal at [facility]           HPI: she is 51-year-old  female with history of obsessive-compulsive disorder and history of stage IV follicular lymphoma diagnosed in 2009 with constitutional symptoms, she received single agent rituximab in September 2010 every week x4 with excellent response, CT/ PET scan in June 2012 showed significant decrease in the lymph nodes size but persistent right inguinal lymphadenopathy  She was treated with bendamustine/rituximab for 4 cycles complicated with herpes zoster involving the shoulder, right cervical, breast area repeat PET scan showed persistent activity in the right inguinal area consistent with recurrent lymphoma, she received radiation therapy to that area finished in August 2013      She had a history of nicotine abuse     Right upper lobe pulmonary nodule she declined surgery in October 2015 biopsy was inconclusive, however because of the appearance and high nicotine abuse, the patient received stereotactic radiation therapy to that area in March 2018         Interval History:  She felt a lump in the right axillary area for the past 3 weeks nontender denies any constitutional symptoms nausea vomiting diarrhea constipation dysuria hematuria melena hematochezia      Previous Treatment:         Test Results:    Imaging: No results found  Labs:   Lab Results   Component Value Date    WBC 5 4 07/26/2018    HGB 15 0 07/26/2018    HCT 43 7 07/26/2018    MCV 96 07/26/2018     07/26/2018     Lab Results   Component Value Date     01/09/2018    K 5 0 11/09/2018    CL 99 11/09/2018    CO2 25 11/09/2018    ANIONGAP 6 11/05/2015    BUN 14 11/09/2018    CREATININE 0 74 01/09/2018    GLUCOSE 104 (H) 01/09/2018    GLUF 97 05/09/2017    CALCIUM 9 5 01/09/2018    AST 27 07/26/2018    ALT 22 07/26/2018    ALKPHOS 110 01/09/2018    PROT 7 1 01/09/2018    BILITOT 0 3 01/09/2018    EGFR >60 0 05/09/2017       No results found for: IRON, TIBC, FERRITIN    Lab Results   Component Value Date    IGDNHDTN39 598 02/16/2018         ROS:   Review of Systems   Constitutional: Negative for activity change, appetite change, diaphoresis, fatigue, fever and unexpected weight change  HENT: Negative for facial swelling, hearing loss, rhinorrhea, sinus pressure, sinus pain, sneezing, sore throat and tinnitus  Eyes: Negative for photophobia, pain, discharge, redness, itching and visual disturbance  Respiratory: Negative for apnea and chest tightness  Cardiovascular: Negative for chest pain, palpitations and leg swelling  Gastrointestinal: Negative for abdominal distention, abdominal pain, blood in stool, constipation, diarrhea, nausea, rectal pain and vomiting  Endocrine: Negative for cold intolerance, heat intolerance, polydipsia and polyphagia  Genitourinary: Negative for difficulty urinating, dyspareunia, frequency, hematuria, pelvic pain and urgency  Musculoskeletal: Negative for arthralgias, back pain, gait problem, joint swelling and myalgias     Skin: Negative for color change, pallor and rash  Allergic/Immunologic: Negative for environmental allergies and food allergies  Neurological: Negative for dizziness, tremors, seizures, syncope, speech difficulty, numbness and headaches  Hematological: Positive for adenopathy (In the right axillary area)  Does not bruise/bleed easily  Psychiatric/Behavioral: Negative for agitation, confusion, dysphoric mood, hallucinations and suicidal ideas  Current Medications: Reviewed  Allergies: Reviewed  PMH/FH/SH:  Reviewed      Physical Exam:    Body surface area is 1 35 meters squared  Wt Readings from Last 3 Encounters:   03/13/19 43 6 kg (96 lb 3 2 oz)   02/21/19 43 5 kg (96 lb)   11/16/18 42 8 kg (94 lb 6 4 oz)        Temp Readings from Last 3 Encounters:   03/13/19 (!) 97 3 °F (36 3 °C) (Tympanic)   11/16/18 (!) 96 9 °F (36 1 °C) (Tympanic)   08/27/18 98 7 °F (37 1 °C) (Tympanic)        BP Readings from Last 3 Encounters:   03/13/19 104/61   11/16/18 101/61   08/27/18 112/80         Pulse Readings from Last 3 Encounters:   03/13/19 77   11/16/18 104   08/27/18 84        Physical Exam   Constitutional: She is oriented to person, place, and time  She appears distressed  HENT:   Head: Normocephalic and atraumatic  Mouth/Throat: Oropharynx is clear and moist  No oropharyngeal exudate  Eyes: Pupils are equal, round, and reactive to light  Conjunctivae and EOM are normal    Neck: Normal range of motion  Neck supple  No tracheal deviation present  No thyromegaly present  Cardiovascular: Normal rate and regular rhythm  Exam reveals no gallop and no friction rub  No murmur heard  Pulmonary/Chest: Effort normal and breath sounds normal  No respiratory distress  She has no wheezes  She has no rales  She exhibits no tenderness  Abdominal: Soft  Bowel sounds are normal  She exhibits mass ( 3 x 4 cm to the right side of the umbilicus)  She exhibits no distension  There is no tenderness   There is no rebound and no guarding  Musculoskeletal: Normal range of motion  Lymphadenopathy:     She has cervical adenopathy (Small 1 x 1 cm mobile lymph node in the right axillary area)  Neurological: She is alert and oriented to person, place, and time  Skin: Skin is warm and dry  No rash noted  She is not diaphoretic  No erythema  No pallor  Breast examination in the front of female nurse showed no evidence of masses   Psychiatric: She has a normal mood and affect  Her behavior is normal  Judgment and thought content normal    Vitals reviewed  Goals and Barriers:  Current Goal: Minimize effects of disease  Barriers: None  Patient's Capacity to Self Care:  Patient is able to self care      Code Status: [unfilled]

## 2019-03-15 ENCOUNTER — TELEPHONE (OUTPATIENT)
Dept: HEMATOLOGY ONCOLOGY | Facility: CLINIC | Age: 74
End: 2019-03-15

## 2019-03-15 DIAGNOSIS — J40 BRONCHITIS WITH TRACHEITIS: Primary | ICD-10-CM

## 2019-03-15 NOTE — TELEPHONE ENCOUNTER
Stated that her Amoxicillin 42 pills of 500mg was never sent to [id number] [address], [address], [address] [place] [address]  [phone]  She would like a refills on it     Req that we call her back to confirm this was sent

## 2019-03-18 RX ORDER — AMOXICILLIN 500 MG/1
500 TABLET, FILM COATED ORAL EVERY 6 HOURS
Qty: 42 TABLET | Refills: 1 | Status: SHIPPED | OUTPATIENT
Start: 2019-03-18 | End: 2019-03-25

## 2019-03-18 NOTE — TELEPHONE ENCOUNTER
Called pt Friday evening and left a message asking to clarify the need for Amoxicillin, I asked to call Monday morning

## 2019-04-04 ENCOUNTER — TELEPHONE (OUTPATIENT)
Dept: HEMATOLOGY ONCOLOGY | Facility: CLINIC | Age: 74
End: 2019-04-04

## 2019-04-09 ENCOUNTER — HOSPITAL ENCOUNTER (OUTPATIENT)
Dept: RADIOLOGY | Age: 74
Discharge: HOME | End: 2019-04-09
Payer: COMMERCIAL

## 2019-04-09 DIAGNOSIS — C82.35 GRADE 3A FOLLICULAR LYMPHOMA OF LYMPH NODES OF INGUINAL REGION (HCC): ICD-10-CM

## 2019-04-09 DIAGNOSIS — C34.11 CANCER OF UPPER LOBE OF RIGHT LUNG (HCC): ICD-10-CM

## 2019-04-09 LAB — GLUCOSE SERPL-MCNC: 110 MG/DL (ref 65–140)

## 2019-04-09 PROCEDURE — 82948 REAGENT STRIP/BLOOD GLUCOSE: CPT

## 2019-04-09 PROCEDURE — 78815 PET IMAGE W/CT SKULL-THIGH: CPT

## 2019-04-09 PROCEDURE — A9552 F18 FDG: HCPCS

## 2019-04-15 ENCOUNTER — TELEPHONE (OUTPATIENT)
Dept: HEMATOLOGY ONCOLOGY | Facility: CLINIC | Age: 74
End: 2019-04-15

## 2019-04-16 ENCOUNTER — OFFICE VISIT (OUTPATIENT)
Dept: HEMATOLOGY ONCOLOGY | Facility: CLINIC | Age: 74
End: 2019-04-16
Payer: COMMERCIAL

## 2019-04-16 VITALS
HEIGHT: 59 IN | SYSTOLIC BLOOD PRESSURE: 116 MMHG | DIASTOLIC BLOOD PRESSURE: 74 MMHG | HEART RATE: 85 BPM | RESPIRATION RATE: 16 BRPM | TEMPERATURE: 99 F | WEIGHT: 95 LBS | BODY MASS INDEX: 19.15 KG/M2

## 2019-04-16 DIAGNOSIS — C82.35 GRADE 3A FOLLICULAR LYMPHOMA OF LYMPH NODES OF INGUINAL REGION (HCC): Primary | ICD-10-CM

## 2019-04-16 PROCEDURE — 99215 OFFICE O/P EST HI 40 MIN: CPT | Performed by: INTERNAL MEDICINE

## 2019-04-18 ENCOUNTER — DOCUMENTATION (OUTPATIENT)
Dept: HEMATOLOGY ONCOLOGY | Facility: CLINIC | Age: 74
End: 2019-04-18

## 2019-04-26 ENCOUNTER — DOCUMENTATION (OUTPATIENT)
Dept: INFUSION CENTER | Facility: HOSPITAL | Age: 74
End: 2019-04-26

## 2019-04-26 ENCOUNTER — TELEPHONE (OUTPATIENT)
Dept: HEMATOLOGY ONCOLOGY | Facility: CLINIC | Age: 74
End: 2019-04-26

## 2019-04-26 DIAGNOSIS — C82.35 GRADE 3A FOLLICULAR LYMPHOMA OF LYMPH NODES OF INGUINAL REGION (HCC): Primary | ICD-10-CM

## 2019-04-30 ENCOUNTER — TELEPHONE (OUTPATIENT)
Dept: HEMATOLOGY ONCOLOGY | Facility: CLINIC | Age: 74
End: 2019-04-30

## 2019-05-01 ENCOUNTER — TELEPHONE (OUTPATIENT)
Dept: HEMATOLOGY ONCOLOGY | Facility: CLINIC | Age: 74
End: 2019-05-01

## 2019-05-01 DIAGNOSIS — C82.35 GRADE 3A FOLLICULAR LYMPHOMA OF LYMPH NODES OF INGUINAL REGION (HCC): ICD-10-CM

## 2019-05-01 DIAGNOSIS — Z29.9 PROPHYLACTIC MEASURE: Primary | ICD-10-CM

## 2019-05-01 RX ORDER — ACYCLOVIR 400 MG/1
400 TABLET ORAL 2 TIMES DAILY
Qty: 60 TABLET | Refills: 3 | Status: SHIPPED | OUTPATIENT
Start: 2019-05-01 | End: 2019-05-31

## 2019-05-08 ENCOUNTER — TELEPHONE (OUTPATIENT)
Dept: HEMATOLOGY ONCOLOGY | Facility: CLINIC | Age: 74
End: 2019-05-08

## 2019-05-17 ENCOUNTER — TELEPHONE (OUTPATIENT)
Dept: HEMATOLOGY ONCOLOGY | Facility: CLINIC | Age: 74
End: 2019-05-17

## 2019-05-21 ENCOUNTER — TELEPHONE (OUTPATIENT)
Dept: HEMATOLOGY ONCOLOGY | Facility: CLINIC | Age: 74
End: 2019-05-21

## 2019-05-22 ENCOUNTER — APPOINTMENT (OUTPATIENT)
Dept: LAB | Facility: CLINIC | Age: 74
End: 2019-05-22
Payer: COMMERCIAL

## 2019-05-22 DIAGNOSIS — C82.35 GRADE 3A FOLLICULAR LYMPHOMA OF LYMPH NODES OF INGUINAL REGION (HCC): ICD-10-CM

## 2019-05-22 DIAGNOSIS — C34.11 CANCER OF UPPER LOBE OF RIGHT LUNG (HCC): ICD-10-CM

## 2019-05-22 DIAGNOSIS — B35.1 ONYCHOMYCOSIS OF TOENAIL: ICD-10-CM

## 2019-05-22 LAB
ALBUMIN SERPL BCP-MCNC: 3.8 G/DL (ref 3.5–5)
ALP SERPL-CCNC: 94 U/L (ref 46–116)
ALT SERPL W P-5'-P-CCNC: 23 U/L (ref 12–78)
ANION GAP SERPL CALCULATED.3IONS-SCNC: 5 MMOL/L (ref 4–13)
AST SERPL W P-5'-P-CCNC: 20 U/L (ref 5–45)
BASOPHILS # BLD AUTO: 0.04 THOUSANDS/ΜL (ref 0–0.1)
BASOPHILS NFR BLD AUTO: 1 % (ref 0–1)
BILIRUB SERPL-MCNC: 0.56 MG/DL (ref 0.2–1)
BUN SERPL-MCNC: 15 MG/DL (ref 5–25)
CALCIUM SERPL-MCNC: 8.6 MG/DL (ref 8.3–10.1)
CHLORIDE SERPL-SCNC: 101 MMOL/L (ref 100–108)
CO2 SERPL-SCNC: 26 MMOL/L (ref 21–32)
CREAT SERPL-MCNC: 0.85 MG/DL (ref 0.6–1.3)
EOSINOPHIL # BLD AUTO: 0.01 THOUSAND/ΜL (ref 0–0.61)
EOSINOPHIL NFR BLD AUTO: 0 % (ref 0–6)
ERYTHROCYTE [DISTWIDTH] IN BLOOD BY AUTOMATED COUNT: 13.6 % (ref 11.6–15.1)
GFR SERPL CREATININE-BSD FRML MDRD: 68 ML/MIN/1.73SQ M
GLUCOSE SERPL-MCNC: 87 MG/DL (ref 65–140)
HCT VFR BLD AUTO: 44.8 % (ref 34.8–46.1)
HGB BLD-MCNC: 15.2 G/DL (ref 11.5–15.4)
IMM GRANULOCYTES # BLD AUTO: 0.02 THOUSAND/UL (ref 0–0.2)
IMM GRANULOCYTES NFR BLD AUTO: 0 % (ref 0–2)
LDH SERPL-CCNC: 226 U/L (ref 81–234)
LYMPHOCYTES # BLD AUTO: 1.22 THOUSANDS/ΜL (ref 0.6–4.47)
LYMPHOCYTES NFR BLD AUTO: 15 % (ref 14–44)
MCH RBC QN AUTO: 33 PG (ref 26.8–34.3)
MCHC RBC AUTO-ENTMCNC: 33.9 G/DL (ref 31.4–37.4)
MCV RBC AUTO: 97 FL (ref 82–98)
MONOCYTES # BLD AUTO: 0.95 THOUSAND/ΜL (ref 0.17–1.22)
MONOCYTES NFR BLD AUTO: 12 % (ref 4–12)
NEUTROPHILS # BLD AUTO: 6.05 THOUSANDS/ΜL (ref 1.85–7.62)
NEUTS SEG NFR BLD AUTO: 72 % (ref 43–75)
NRBC BLD AUTO-RTO: 0 /100 WBCS
PLATELET # BLD AUTO: 220 THOUSANDS/UL (ref 149–390)
PMV BLD AUTO: 10.7 FL (ref 8.9–12.7)
POTASSIUM SERPL-SCNC: 4.3 MMOL/L (ref 3.5–5.3)
PROT SERPL-MCNC: 7.2 G/DL (ref 6.4–8.2)
RBC # BLD AUTO: 4.6 MILLION/UL (ref 3.81–5.12)
SODIUM SERPL-SCNC: 132 MMOL/L (ref 136–145)
WBC # BLD AUTO: 8.29 THOUSAND/UL (ref 4.31–10.16)

## 2019-05-22 PROCEDURE — 83615 LACTATE (LD) (LDH) ENZYME: CPT

## 2019-05-22 PROCEDURE — 85025 COMPLETE CBC W/AUTO DIFF WBC: CPT

## 2019-05-22 PROCEDURE — 36415 COLL VENOUS BLD VENIPUNCTURE: CPT

## 2019-05-22 PROCEDURE — 82784 ASSAY IGA/IGD/IGG/IGM EACH: CPT

## 2019-05-22 PROCEDURE — 80053 COMPREHEN METABOLIC PANEL: CPT

## 2019-05-23 ENCOUNTER — TELEPHONE (OUTPATIENT)
Dept: HEMATOLOGY ONCOLOGY | Facility: CLINIC | Age: 74
End: 2019-05-23

## 2019-05-23 LAB
IGA SERPL-MCNC: 199 MG/DL (ref 70–400)
IGG SERPL-MCNC: 796 MG/DL (ref 700–1600)
IGM SERPL-MCNC: 25 MG/DL (ref 40–230)

## 2019-06-03 ENCOUNTER — TELEPHONE (OUTPATIENT)
Dept: RADIATION ONCOLOGY | Facility: CLINIC | Age: 74
End: 2019-06-03

## 2019-06-04 ENCOUNTER — OFFICE VISIT (OUTPATIENT)
Dept: HEMATOLOGY ONCOLOGY | Facility: CLINIC | Age: 74
End: 2019-06-04
Payer: COMMERCIAL

## 2019-06-04 VITALS
WEIGHT: 92.4 LBS | OXYGEN SATURATION: 98 % | SYSTOLIC BLOOD PRESSURE: 120 MMHG | HEIGHT: 59 IN | HEART RATE: 78 BPM | BODY MASS INDEX: 18.63 KG/M2 | DIASTOLIC BLOOD PRESSURE: 60 MMHG | TEMPERATURE: 98.2 F | RESPIRATION RATE: 16 BRPM

## 2019-06-04 DIAGNOSIS — C82.88 OTHER TYPE OF FOLLICULAR LYMPHOMA OF LYMPH NODES OF MULTIPLE REGIONS (HCC): Primary | ICD-10-CM

## 2019-06-04 PROCEDURE — 99215 OFFICE O/P EST HI 40 MIN: CPT | Performed by: INTERNAL MEDICINE

## 2019-06-05 ENCOUNTER — TELEPHONE (OUTPATIENT)
Dept: HEMATOLOGY ONCOLOGY | Facility: CLINIC | Age: 74
End: 2019-06-05

## 2019-06-05 DIAGNOSIS — C82.48 GRADE 3B FOLLICULAR LYMPHOMA OF LYMPH NODES OF MULTIPLE REGIONS (HCC): ICD-10-CM

## 2019-06-05 DIAGNOSIS — C82.88 OTHER TYPE OF FOLLICULAR LYMPHOMA OF LYMPH NODES OF MULTIPLE REGIONS (HCC): ICD-10-CM

## 2019-06-06 DIAGNOSIS — C82.48 GRADE 3B FOLLICULAR LYMPHOMA OF LYMPH NODES OF MULTIPLE REGIONS (HCC): Primary | ICD-10-CM

## 2019-06-06 DIAGNOSIS — C82.88 OTHER TYPE OF FOLLICULAR LYMPHOMA OF LYMPH NODES OF MULTIPLE REGIONS (HCC): ICD-10-CM

## 2019-06-06 RX ORDER — ACETAMINOPHEN 325 MG/1
650 TABLET ORAL ONCE
Status: CANCELLED | OUTPATIENT
Start: 2019-06-26

## 2019-06-06 RX ORDER — SODIUM CHLORIDE 9 MG/ML
20 INJECTION, SOLUTION INTRAVENOUS ONCE
Status: CANCELLED | OUTPATIENT
Start: 2020-05-19

## 2019-06-06 RX ORDER — SODIUM CHLORIDE 9 MG/ML
20 INJECTION, SOLUTION INTRAVENOUS ONCE
Status: CANCELLED | OUTPATIENT
Start: 2020-05-12

## 2019-06-06 RX ORDER — SODIUM CHLORIDE 9 MG/ML
20 INJECTION, SOLUTION INTRAVENOUS ONCE
Status: CANCELLED | OUTPATIENT
Start: 2020-05-26

## 2019-06-06 RX ORDER — SODIUM CHLORIDE 9 MG/ML
20 INJECTION, SOLUTION INTRAVENOUS ONCE
Status: CANCELLED | OUTPATIENT
Start: 2020-06-02

## 2019-06-06 RX ORDER — ACETAMINOPHEN 325 MG/1
650 TABLET ORAL ONCE
Status: CANCELLED | OUTPATIENT
Start: 2019-12-17

## 2019-06-06 RX ORDER — SODIUM CHLORIDE 9 MG/ML
20 INJECTION, SOLUTION INTRAVENOUS ONCE
Status: CANCELLED | OUTPATIENT
Start: 2019-06-10

## 2019-06-06 RX ORDER — SODIUM CHLORIDE 9 MG/ML
20 INJECTION, SOLUTION INTRAVENOUS ONCE
Status: CANCELLED | OUTPATIENT
Start: 2019-12-17

## 2019-06-06 RX ORDER — SODIUM CHLORIDE 9 MG/ML
20 INJECTION, SOLUTION INTRAVENOUS ONCE
Status: CANCELLED | OUTPATIENT
Start: 2019-11-26

## 2019-06-06 RX ORDER — ACETAMINOPHEN 325 MG/1
650 TABLET ORAL ONCE
Status: CANCELLED | OUTPATIENT
Start: 2020-06-02

## 2019-06-06 RX ORDER — SODIUM CHLORIDE 9 MG/ML
20 INJECTION, SOLUTION INTRAVENOUS ONCE
Status: CANCELLED | OUTPATIENT
Start: 2019-06-26

## 2019-06-06 RX ORDER — ACETAMINOPHEN 325 MG/1
650 TABLET ORAL ONCE
Status: CANCELLED | OUTPATIENT
Start: 2019-11-26

## 2019-06-06 RX ORDER — ACETAMINOPHEN 325 MG/1
650 TABLET ORAL ONCE
Status: CANCELLED | OUTPATIENT
Start: 2019-06-19

## 2019-06-06 RX ORDER — ACETAMINOPHEN 325 MG/1
650 TABLET ORAL ONCE
Status: CANCELLED | OUTPATIENT
Start: 2020-05-26

## 2019-06-06 RX ORDER — ACETAMINOPHEN 325 MG/1
650 TABLET ORAL ONCE
Status: CANCELLED | OUTPATIENT
Start: 2020-05-19

## 2019-06-06 RX ORDER — SODIUM CHLORIDE 9 MG/ML
20 INJECTION, SOLUTION INTRAVENOUS ONCE
Status: CANCELLED | OUTPATIENT
Start: 2019-06-19

## 2019-06-06 RX ORDER — SODIUM CHLORIDE 9 MG/ML
20 INJECTION, SOLUTION INTRAVENOUS ONCE
Status: CANCELLED | OUTPATIENT
Start: 2019-12-03

## 2019-06-06 RX ORDER — SODIUM CHLORIDE 9 MG/ML
20 INJECTION, SOLUTION INTRAVENOUS ONCE
Status: CANCELLED | OUTPATIENT
Start: 2019-12-10

## 2019-06-06 RX ORDER — ACETAMINOPHEN 325 MG/1
650 TABLET ORAL ONCE
Status: CANCELLED | OUTPATIENT
Start: 2019-12-03

## 2019-06-06 RX ORDER — SODIUM CHLORIDE 9 MG/ML
20 INJECTION, SOLUTION INTRAVENOUS ONCE
Status: CANCELLED | OUTPATIENT
Start: 2019-07-02

## 2019-06-06 RX ORDER — ACETAMINOPHEN 325 MG/1
650 TABLET ORAL ONCE
Status: CANCELLED | OUTPATIENT
Start: 2019-12-10

## 2019-06-06 RX ORDER — ACETAMINOPHEN 325 MG/1
650 TABLET ORAL ONCE
Status: CANCELLED | OUTPATIENT
Start: 2020-05-12

## 2019-06-06 RX ORDER — ACETAMINOPHEN 325 MG/1
650 TABLET ORAL ONCE
Status: CANCELLED | OUTPATIENT
Start: 2019-07-02

## 2019-06-06 RX ORDER — ACETAMINOPHEN 325 MG/1
650 TABLET ORAL ONCE
Status: CANCELLED | OUTPATIENT
Start: 2019-06-10

## 2019-06-07 ENCOUNTER — TELEPHONE (OUTPATIENT)
Dept: HEMATOLOGY ONCOLOGY | Facility: CLINIC | Age: 74
End: 2019-06-07

## 2019-06-10 ENCOUNTER — HOSPITAL ENCOUNTER (OUTPATIENT)
Dept: INFUSION CENTER | Facility: HOSPITAL | Age: 74
Discharge: HOME | End: 2019-06-10
Payer: COMMERCIAL

## 2019-06-10 VITALS
BODY MASS INDEX: 17.91 KG/M2 | DIASTOLIC BLOOD PRESSURE: 75 MMHG | OXYGEN SATURATION: 98 % | RESPIRATION RATE: 18 BRPM | HEIGHT: 59 IN | HEART RATE: 76 BPM | TEMPERATURE: 98.9 F | SYSTOLIC BLOOD PRESSURE: 132 MMHG | WEIGHT: 88.85 LBS

## 2019-06-10 DIAGNOSIS — C82.48 GRADE 3B FOLLICULAR LYMPHOMA OF LYMPH NODES OF MULTIPLE REGIONS (HCC): Primary | ICD-10-CM

## 2019-06-10 DIAGNOSIS — C82.88 OTHER TYPE OF FOLLICULAR LYMPHOMA OF LYMPH NODES OF MULTIPLE REGIONS (HCC): ICD-10-CM

## 2019-06-10 DIAGNOSIS — C82.35 GRADE 3A FOLLICULAR LYMPHOMA OF LYMPH NODES OF INGUINAL REGION (HCC): Primary | ICD-10-CM

## 2019-06-10 PROCEDURE — 96361 HYDRATE IV INFUSION ADD-ON: CPT

## 2019-06-10 PROCEDURE — 96376 TX/PRO/DX INJ SAME DRUG ADON: CPT

## 2019-06-10 PROCEDURE — 96375 TX/PRO/DX INJ NEW DRUG ADDON: CPT

## 2019-06-10 PROCEDURE — 96413 CHEMO IV INFUSION 1 HR: CPT

## 2019-06-10 PROCEDURE — 96415 CHEMO IV INFUSION ADDL HR: CPT

## 2019-06-10 PROCEDURE — 96367 TX/PROPH/DG ADDL SEQ IV INF: CPT

## 2019-06-10 RX ORDER — SODIUM CHLORIDE 9 MG/ML
20 INJECTION, SOLUTION INTRAVENOUS ONCE
Status: COMPLETED | OUTPATIENT
Start: 2019-06-10 | End: 2019-06-10

## 2019-06-10 RX ORDER — DIPHENHYDRAMINE HYDROCHLORIDE 50 MG/ML
25 INJECTION INTRAMUSCULAR; INTRAVENOUS
Status: COMPLETED | OUTPATIENT
Start: 2019-06-10 | End: 2019-06-10

## 2019-06-10 RX ORDER — RANITIDINE 25 MG/ML
50 INJECTION, SOLUTION INTRAMUSCULAR; INTRAVENOUS ONCE
Status: COMPLETED | OUTPATIENT
Start: 2019-06-10 | End: 2019-06-10

## 2019-06-10 RX ORDER — ACETAMINOPHEN 325 MG/1
650 TABLET ORAL ONCE
Status: COMPLETED | OUTPATIENT
Start: 2019-06-10 | End: 2019-06-10

## 2019-06-10 RX ADMIN — SODIUM CHLORIDE 500 ML: 0.9 INJECTION, SOLUTION INTRAVENOUS at 12:52

## 2019-06-10 RX ADMIN — SODIUM CHLORIDE 20 ML/HR: 0.9 INJECTION, SOLUTION INTRAVENOUS at 10:34

## 2019-06-10 RX ADMIN — RANITIDINE 50 MG: 25 INJECTION, SOLUTION INTRAMUSCULAR; INTRAVENOUS at 12:57

## 2019-06-10 RX ADMIN — HYDROCORTISONE SODIUM SUCCINATE 50 MG: 100 INJECTION, POWDER, FOR SOLUTION INTRAMUSCULAR; INTRAVENOUS at 13:04

## 2019-06-10 RX ADMIN — ACETAMINOPHEN 650 MG: 325 TABLET, FILM COATED ORAL at 10:34

## 2019-06-10 RX ADMIN — RITUXIMAB 500 MG: 10 INJECTION, SOLUTION INTRAVENOUS at 11:50

## 2019-06-10 RX ADMIN — DIPHENHYDRAMINE HYDROCHLORIDE 25 MG: 50 INJECTION, SOLUTION INTRAMUSCULAR; INTRAVENOUS at 12:55

## 2019-06-10 RX ADMIN — DIPHENHYDRAMINE HYDROCHLORIDE 25 MG: 50 INJECTION INTRAMUSCULAR; INTRAVENOUS at 10:34

## 2019-06-10 RX ADMIN — HYDROCORTISONE SODIUM SUCCINATE 50 MG: 100 INJECTION, POWDER, FOR SOLUTION INTRAMUSCULAR; INTRAVENOUS at 12:57

## 2019-06-10 RX ADMIN — DIPHENHYDRAMINE HYDROCHLORIDE 25 MG: 50 INJECTION, SOLUTION INTRAMUSCULAR; INTRAVENOUS at 13:03

## 2019-06-10 NOTE — PLAN OF CARE
Problem: Potential for Falls  Goal: Patient will remain free of falls  Description  INTERVENTIONS:  - Assess patient frequently for physical needs  -  Identify cognitive and physical deficits and behaviors that affect risk of falls    -  [place] fall precautions as indicated by assessment   - Educate patient/family on patient safety including physical limitations  - Instruct patient to call for assistance with activity based on assessment  - Modify environment to reduce risk of injury  - Consider OT/PT consult to assist with strengthening/mobility  Outcome: Progressing

## 2019-06-10 NOTE — PROGRESS NOTES
Spoke with Reji Robert RN at [facility] office, made aware of s/s of reaction and that hypersensitivity protocol followed  Pt already states feeling slightly better  Abd pain continues, along with headache and slight chills  Temp 98 8  Will continue to monitor pt

## 2019-06-10 NOTE — PROGRESS NOTES
S/s completely resolved  Pt sleeping  Rituxan restarted at half the previous rate (restarting at 25ml/hr) as per Froilan Goff called and pt is to have labs drawn q other week during Rituxan infusions  Will inform pt

## 2019-06-10 NOTE — PROGRESS NOTES
Pt c/o itching b/l ears, facial flushing and ear flushing noted  Pt c/o abd pain "under left rib", nausea, nasal congestion at 1255  Rituxan immediately stopped, hypersensitivity protocol started for flushing  See MAR for meds administered and flowsheet for vs  Will notify Dr Karina carroll

## 2019-06-10 NOTE — PROGRESS NOTES
Per Petra Arenas, once s/s subside completely, may restart Rituxan at half the previous rate of 50ml/hr and follow remainder of titration protocol

## 2019-06-11 ENCOUNTER — TELEPHONE (OUTPATIENT)
Dept: HEMATOLOGY ONCOLOGY | Facility: CLINIC | Age: 74
End: 2019-06-11

## 2019-06-11 NOTE — TELEPHONE ENCOUNTER
Called patient back and she stated she had more questions for you as to why labs should be drawn every other week instead of weekly

## 2019-06-11 NOTE — TELEPHONE ENCOUNTER
Dr Yaquelin Gardiner returned call and spoke with Pt  Explained that labs are ordered per protocol  She is due for her labs again in 2 weeks

## 2019-06-18 ENCOUNTER — APPOINTMENT (OUTPATIENT)
Dept: LAB | Facility: CLINIC | Age: 74
End: 2019-06-18
Payer: COMMERCIAL

## 2019-06-18 LAB
ALBUMIN SERPL BCP-MCNC: 3.5 G/DL (ref 3.5–5)
ALP SERPL-CCNC: 101 U/L (ref 46–116)
ALT SERPL W P-5'-P-CCNC: 28 U/L (ref 12–78)
ANION GAP SERPL CALCULATED.3IONS-SCNC: 5 MMOL/L (ref 4–13)
AST SERPL W P-5'-P-CCNC: 23 U/L (ref 5–45)
BASOPHILS # BLD AUTO: 0.04 THOUSANDS/ΜL (ref 0–0.1)
BASOPHILS NFR BLD AUTO: 1 % (ref 0–1)
BILIRUB SERPL-MCNC: 0.33 MG/DL (ref 0.2–1)
BUN SERPL-MCNC: 11 MG/DL (ref 5–25)
CALCIUM SERPL-MCNC: 8.8 MG/DL (ref 8.3–10.1)
CHLORIDE SERPL-SCNC: 105 MMOL/L (ref 100–108)
CO2 SERPL-SCNC: 29 MMOL/L (ref 21–32)
CREAT SERPL-MCNC: 0.74 MG/DL (ref 0.6–1.3)
EOSINOPHIL # BLD AUTO: 0.04 THOUSAND/ΜL (ref 0–0.61)
EOSINOPHIL NFR BLD AUTO: 1 % (ref 0–6)
ERYTHROCYTE [DISTWIDTH] IN BLOOD BY AUTOMATED COUNT: 13.9 % (ref 11.6–15.1)
GFR SERPL CREATININE-BSD FRML MDRD: 81 ML/MIN/1.73SQ M
GLUCOSE P FAST SERPL-MCNC: 106 MG/DL (ref 65–99)
HCT VFR BLD AUTO: 44.3 % (ref 34.8–46.1)
HGB BLD-MCNC: 14.6 G/DL (ref 11.5–15.4)
IMM GRANULOCYTES # BLD AUTO: 0.01 THOUSAND/UL (ref 0–0.2)
IMM GRANULOCYTES NFR BLD AUTO: 0 % (ref 0–2)
LYMPHOCYTES # BLD AUTO: 0.9 THOUSANDS/ΜL (ref 0.6–4.47)
LYMPHOCYTES NFR BLD AUTO: 20 % (ref 14–44)
MCH RBC QN AUTO: 32.3 PG (ref 26.8–34.3)
MCHC RBC AUTO-ENTMCNC: 33 G/DL (ref 31.4–37.4)
MCV RBC AUTO: 98 FL (ref 82–98)
MONOCYTES # BLD AUTO: 0.68 THOUSAND/ΜL (ref 0.17–1.22)
MONOCYTES NFR BLD AUTO: 15 % (ref 4–12)
NEUTROPHILS # BLD AUTO: 2.87 THOUSANDS/ΜL (ref 1.85–7.62)
NEUTS SEG NFR BLD AUTO: 63 % (ref 43–75)
NRBC BLD AUTO-RTO: 0 /100 WBCS
PLATELET # BLD AUTO: 252 THOUSANDS/UL (ref 149–390)
PMV BLD AUTO: 10.1 FL (ref 8.9–12.7)
POTASSIUM SERPL-SCNC: 3.8 MMOL/L (ref 3.5–5.3)
PROT SERPL-MCNC: 6.7 G/DL (ref 6.4–8.2)
RBC # BLD AUTO: 4.52 MILLION/UL (ref 3.81–5.12)
SODIUM SERPL-SCNC: 139 MMOL/L (ref 136–145)
WBC # BLD AUTO: 4.54 THOUSAND/UL (ref 4.31–10.16)

## 2019-06-18 PROCEDURE — 80053 COMPREHEN METABOLIC PANEL: CPT

## 2019-06-18 PROCEDURE — 36415 COLL VENOUS BLD VENIPUNCTURE: CPT

## 2019-06-18 PROCEDURE — 85025 COMPLETE CBC W/AUTO DIFF WBC: CPT

## 2019-06-19 ENCOUNTER — HOSPITAL ENCOUNTER (OUTPATIENT)
Dept: INFUSION CENTER | Facility: CLINIC | Age: 74
Discharge: HOME | End: 2019-06-19
Payer: COMMERCIAL

## 2019-06-19 VITALS
WEIGHT: 90.5 LBS | SYSTOLIC BLOOD PRESSURE: 100 MMHG | DIASTOLIC BLOOD PRESSURE: 62 MMHG | RESPIRATION RATE: 16 BRPM | HEIGHT: 59 IN | HEART RATE: 71 BPM | BODY MASS INDEX: 18.24 KG/M2 | OXYGEN SATURATION: 97 % | TEMPERATURE: 98.6 F

## 2019-06-19 DIAGNOSIS — C82.88 OTHER TYPE OF FOLLICULAR LYMPHOMA OF LYMPH NODES OF MULTIPLE REGIONS (HCC): ICD-10-CM

## 2019-06-19 DIAGNOSIS — C82.48 GRADE 3B FOLLICULAR LYMPHOMA OF LYMPH NODES OF MULTIPLE REGIONS (HCC): Primary | ICD-10-CM

## 2019-06-19 PROCEDURE — 96413 CHEMO IV INFUSION 1 HR: CPT

## 2019-06-19 PROCEDURE — 96367 TX/PROPH/DG ADDL SEQ IV INF: CPT

## 2019-06-19 PROCEDURE — 96415 CHEMO IV INFUSION ADDL HR: CPT

## 2019-06-19 RX ORDER — SODIUM CHLORIDE 9 MG/ML
20 INJECTION, SOLUTION INTRAVENOUS ONCE
Status: COMPLETED | OUTPATIENT
Start: 2019-06-19 | End: 2019-06-19

## 2019-06-19 RX ORDER — ACETAMINOPHEN 325 MG/1
650 TABLET ORAL ONCE
Status: COMPLETED | OUTPATIENT
Start: 2019-06-19 | End: 2019-06-19

## 2019-06-19 RX ADMIN — RITUXIMAB 500 MG: 10 INJECTION, SOLUTION INTRAVENOUS at 11:54

## 2019-06-19 RX ADMIN — SODIUM CHLORIDE 20 ML/HR: 0.9 INJECTION, SOLUTION INTRAVENOUS at 10:51

## 2019-06-19 RX ADMIN — DIPHENHYDRAMINE HYDROCHLORIDE 25 MG: 50 INJECTION, SOLUTION INTRAMUSCULAR; INTRAVENOUS at 11:03

## 2019-06-19 RX ADMIN — ACETAMINOPHEN 650 MG: 325 TABLET, FILM COATED ORAL at 11:03

## 2019-06-19 NOTE — PROGRESS NOTES
Pt tolerated rituxan infusion well without any complaints of adverse reactions, avs given to patient

## 2019-06-26 ENCOUNTER — HOSPITAL ENCOUNTER (OUTPATIENT)
Dept: INFUSION CENTER | Facility: CLINIC | Age: 74
Discharge: HOME | End: 2019-06-26
Payer: COMMERCIAL

## 2019-06-26 VITALS
OXYGEN SATURATION: 98 % | HEIGHT: 59 IN | HEART RATE: 78 BPM | DIASTOLIC BLOOD PRESSURE: 60 MMHG | BODY MASS INDEX: 18.14 KG/M2 | RESPIRATION RATE: 16 BRPM | WEIGHT: 90 LBS | SYSTOLIC BLOOD PRESSURE: 107 MMHG | TEMPERATURE: 98.5 F

## 2019-06-26 DIAGNOSIS — C82.88 OTHER TYPE OF FOLLICULAR LYMPHOMA OF LYMPH NODES OF MULTIPLE REGIONS (HCC): ICD-10-CM

## 2019-06-26 DIAGNOSIS — C82.48 GRADE 3B FOLLICULAR LYMPHOMA OF LYMPH NODES OF MULTIPLE REGIONS (HCC): Primary | ICD-10-CM

## 2019-06-26 PROCEDURE — 96367 TX/PROPH/DG ADDL SEQ IV INF: CPT

## 2019-06-26 PROCEDURE — 96413 CHEMO IV INFUSION 1 HR: CPT

## 2019-06-26 PROCEDURE — 96415 CHEMO IV INFUSION ADDL HR: CPT

## 2019-06-26 RX ORDER — ACETAMINOPHEN 325 MG/1
650 TABLET ORAL ONCE
Status: COMPLETED | OUTPATIENT
Start: 2019-06-26 | End: 2019-06-26

## 2019-06-26 RX ORDER — SODIUM CHLORIDE 9 MG/ML
20 INJECTION, SOLUTION INTRAVENOUS ONCE
Status: COMPLETED | OUTPATIENT
Start: 2019-06-26 | End: 2019-06-26

## 2019-06-26 RX ADMIN — DIPHENHYDRAMINE HYDROCHLORIDE 25 MG: 50 INJECTION, SOLUTION INTRAMUSCULAR; INTRAVENOUS at 10:41

## 2019-06-26 RX ADMIN — ACETAMINOPHEN 650 MG: 325 TABLET, FILM COATED ORAL at 10:41

## 2019-06-26 RX ADMIN — SODIUM CHLORIDE 20 ML/HR: 0.9 INJECTION, SOLUTION INTRAVENOUS at 10:38

## 2019-06-26 RX ADMIN — RITUXIMAB 500 MG: 10 INJECTION, SOLUTION INTRAVENOUS at 11:23

## 2019-06-26 NOTE — PROGRESS NOTES
Patient tolerated Rituxan infusion without incident  Peripheral IV removed  Patient aware of next appointment  AVS printed and given to patient

## 2019-06-28 ENCOUNTER — TELEPHONE (OUTPATIENT)
Dept: HEMATOLOGY ONCOLOGY | Facility: CLINIC | Age: 74
End: 2019-06-28

## 2019-07-02 ENCOUNTER — HOSPITAL ENCOUNTER (OUTPATIENT)
Dept: INFUSION CENTER | Facility: CLINIC | Age: 74
Discharge: HOME | End: 2019-07-02
Payer: COMMERCIAL

## 2019-07-02 ENCOUNTER — TELEPHONE (OUTPATIENT)
Dept: HEMATOLOGY ONCOLOGY | Facility: CLINIC | Age: 74
End: 2019-07-02

## 2019-07-02 VITALS
OXYGEN SATURATION: 97 % | HEART RATE: 81 BPM | RESPIRATION RATE: 18 BRPM | WEIGHT: 91 LBS | TEMPERATURE: 97.9 F | HEIGHT: 59 IN | SYSTOLIC BLOOD PRESSURE: 102 MMHG | BODY MASS INDEX: 18.35 KG/M2 | DIASTOLIC BLOOD PRESSURE: 60 MMHG

## 2019-07-02 DIAGNOSIS — C82.48 GRADE 3B FOLLICULAR LYMPHOMA OF LYMPH NODES OF MULTIPLE REGIONS (HCC): Primary | ICD-10-CM

## 2019-07-02 DIAGNOSIS — C82.88 OTHER TYPE OF FOLLICULAR LYMPHOMA OF LYMPH NODES OF MULTIPLE REGIONS (HCC): ICD-10-CM

## 2019-07-02 PROCEDURE — 96367 TX/PROPH/DG ADDL SEQ IV INF: CPT

## 2019-07-02 PROCEDURE — 96413 CHEMO IV INFUSION 1 HR: CPT

## 2019-07-02 PROCEDURE — 96415 CHEMO IV INFUSION ADDL HR: CPT

## 2019-07-02 RX ORDER — SODIUM CHLORIDE 9 MG/ML
20 INJECTION, SOLUTION INTRAVENOUS ONCE
Status: COMPLETED | OUTPATIENT
Start: 2019-07-02 | End: 2019-07-02

## 2019-07-02 RX ORDER — ACETAMINOPHEN 325 MG/1
650 TABLET ORAL ONCE
Status: COMPLETED | OUTPATIENT
Start: 2019-07-02 | End: 2019-07-02

## 2019-07-02 RX ADMIN — ACETAMINOPHEN 650 MG: 325 TABLET, FILM COATED ORAL at 11:15

## 2019-07-02 RX ADMIN — RITUXIMAB 500 MG: 10 INJECTION, SOLUTION INTRAVENOUS at 11:50

## 2019-07-02 RX ADMIN — DIPHENHYDRAMINE HYDROCHLORIDE 25 MG: 50 INJECTION, SOLUTION INTRAMUSCULAR; INTRAVENOUS at 11:25

## 2019-07-02 RX ADMIN — SODIUM CHLORIDE 20 ML/HR: 0.9 INJECTION, SOLUTION INTRAVENOUS at 11:00

## 2019-07-02 NOTE — PROGRESS NOTES
Therapy completed w/o incident  Pt does not have further treatments scheduled  She sees Dr Minnie Wiseman on 7/5/19 and future appts will me made with that visit

## 2019-07-02 NOTE — PROGRESS NOTES
Patient presents today for treatment with Rituxan  Patient offers no complaints  VSS  No lab parameters associated with this order  Peripheral IV inserted without incident

## 2019-07-02 NOTE — TELEPHONE ENCOUNTER
Pt called and requested that lab script be faxed over [facility]  Lab faxed to that number

## 2019-07-05 ENCOUNTER — OFFICE VISIT (OUTPATIENT)
Dept: HEMATOLOGY ONCOLOGY | Facility: CLINIC | Age: 74
End: 2019-07-05
Payer: COMMERCIAL

## 2019-07-05 VITALS
BODY MASS INDEX: 17.87 KG/M2 | WEIGHT: 91 LBS | HEIGHT: 60 IN | RESPIRATION RATE: 16 BRPM | SYSTOLIC BLOOD PRESSURE: 104 MMHG | DIASTOLIC BLOOD PRESSURE: 70 MMHG | HEART RATE: 71 BPM | TEMPERATURE: 98.1 F | OXYGEN SATURATION: 94 %

## 2019-07-05 DIAGNOSIS — C82.48 GRADE 3B FOLLICULAR LYMPHOMA OF LYMPH NODES OF MULTIPLE REGIONS (HCC): Primary | ICD-10-CM

## 2019-07-05 DIAGNOSIS — C82.88 OTHER TYPE OF FOLLICULAR LYMPHOMA OF LYMPH NODES OF MULTIPLE REGIONS (HCC): ICD-10-CM

## 2019-07-05 PROCEDURE — 99215 OFFICE O/P EST HI 40 MIN: CPT | Performed by: INTERNAL MEDICINE

## 2019-07-05 RX ORDER — ACETAMINOPHEN 325 MG/1
650 TABLET ORAL ONCE
Status: CANCELLED | OUTPATIENT
Start: 2019-07-23

## 2019-07-05 RX ORDER — SODIUM CHLORIDE 9 MG/ML
20 INJECTION, SOLUTION INTRAVENOUS ONCE
Status: CANCELLED | OUTPATIENT
Start: 2019-07-23

## 2019-07-05 NOTE — PROGRESS NOTES
Hematology Outpatient Follow - Up Note  Hannah Wheeler 68 y o  female MRN: @ Encounter: [id number]        Date:  7/5/2019        Assessment/ Plan:    Relapsed grade 3a follicular lymphoma as mentioned in the history of present illness diagnosed in 2009, heavily pretreated stage IV with lymphadenopathy above and below the diaphragm, bone marrow involvement, however LDH is normal    She still have night sweats, the patient had seen rituximab, rituximab/bendamustine complicated with herpes zoster    She took ibrutinib 420 mg on 04/16/2019 for 2 weeks complicated with bloating, nausea, vomiting, hallucination, weight loss, fatigue she start ibrutinib despite advice of dose reduction    She received rituximab 375 milligram/meter squared weekly x4 doses    According to the FDA recent approval lenalidomide 20 mg p o  Daily 3 weeks on 1 week off, rituximab every month for total of 5 doses and continuation of lenalidomide 20 mg p  O  Daily 3 weeks on 1 week off for total of 1 year with response rate of 80%    I showed the patient the clinical trial, the side effect the sponsor rate, the complete response rate, progression free survival    She agreed    However because of her BMI that is too low I will start lenalidomide at 10 mg p o  Daily 3 weeks on 1 week off with monthly rituximab    Side effects of therapy that are but not limited to opportunistic infection, secondary malignancy, pancytopenia, hepatic and renal insufficiency, reactivation of hepatitis with told he agree to proceed    Follow-up every 4 weeks with CBC, CMP and LDH       I have spent 40 minutes with Patient  today in which greater than 50% of this time was spent in counseling/coordination of care regarding Risks and benefits of tx options        HPI: 43-year-old  female with history of obsessive-compulsive disorder, stage IV follicular lymphoma diagnosed in 2009 with constitutional symptoms, retroperitoneal lymphadenopathy, inguinal lymphadenopathy, she received single agent rituximab in September 2010 every week x4 with excellent response, CT/PET scan in June 2012 showed significant decrease in the lymph node size  Later on she had relapse in 2012 she was treated with bendamustine/rituximab for 4 cycles complicated with herpes zoster involving the right shoulder, right cervical and breast area repeat PET scan showed persistent activity in the right inguinal area consistent with recurrent/persistent lymphoma she received radiation therapy to that area finished in August 2013     Right upper pulmonary nodule she declined surgery in October 2015 biopsy was inconclusive this was watched, however because of the appearance and heavy smoking abuse she received stereotactic radiation therapy to that area in March 2018     Recurrence of lymphoma in March 2019 with right axillary lymphadenopathy, PET scan on 04/09/2019 showed FDG avid lymph nodes in the neck, right axilla, mediastinum, upper abdomen, mesentery with most extensive disease in the abdomen and scattered osseous metastasis  She could not tolerate ibrutinib at 420 mg p  O  Daily for 2 weeks with nausea, vomiting, abdominal pain, fatigue, bloating, she discontinued ibrutinib    She received rituximab 375 milligram/meter squared weekly x4 doses finished in June 2019     Interval History:  Still with drenching night sweats, weight loss       Previous Treatment:         Test Results:    Imaging: No results found      Labs:   Lab Results   Component Value Date    WBC 4 54 06/18/2019    HGB 14 6 06/18/2019    HCT 44 3 06/18/2019    MCV 98 06/18/2019     06/18/2019     Lab Results   Component Value Date     01/09/2018    K 3 8 06/18/2019     06/18/2019    CO2 29 06/18/2019    ANIONGAP 6 11/05/2015    BUN 11 06/18/2019    CREATININE 0 74 06/18/2019    GLUCOSE 104 (H) 01/09/2018    GLUF 106 (H) 06/18/2019    CALCIUM 8 8 06/18/2019    AST 23 06/18/2019    ALT 28 06/18/2019    ALKPHOS 101 06/18/2019 PROT 7 1 01/09/2018    BILITOT 0 3 01/09/2018    EGFR 81 06/18/2019       No results found for: IRON, TIBC, FERRITIN    Lab Results   Component Value Date    [id number] 598 02/16/2018         ROS:   Review of Systems   Constitutional: Positive for chills and unexpected weight change (2 lb weight loss)  Negative for activity change, appetite change, diaphoresis, fatigue and fever  HENT: Negative for facial swelling, hearing loss, rhinorrhea, sinus pressure, sinus pain, sneezing, sore throat and tinnitus  Eyes: Negative for photophobia, pain, discharge, redness, itching and visual disturbance  Respiratory: Positive for cough  Negative for apnea and chest tightness  Cardiovascular: Negative for chest pain, palpitations and leg swelling  Gastrointestinal: Negative for abdominal distention, abdominal pain, blood in stool, constipation, diarrhea, nausea, rectal pain and vomiting  Endocrine: Negative for cold intolerance, heat intolerance, polydipsia and polyphagia  Genitourinary: Negative for difficulty urinating, dyspareunia, frequency, hematuria, pelvic pain and urgency  Musculoskeletal: Negative for arthralgias, back pain, gait problem, joint swelling and myalgias  Skin: Negative for color change, pallor and rash  Allergic/Immunologic: Negative for environmental allergies and food allergies  Neurological: Negative for dizziness, tremors, seizures, syncope, speech difficulty, numbness and headaches  Hematological: Negative for adenopathy  Does not bruise/bleed easily  Psychiatric/Behavioral: Negative for agitation, confusion, dysphoric mood, hallucinations and suicidal ideas  Current Medications: Reviewed  Allergies: Reviewed  PMH/FH/SH:  Reviewed      Physical Exam:    Body surface area is 1 33 meters squared      Wt Readings from Last 3 Encounters:   07/05/19 41 3 kg (91 lb)   07/02/19 41 3 kg (91 lb)   06/26/19 40 8 kg (90 lb)        Temp Readings from Last 3 Encounters:   07/05/19 98 1 °F (36 7 °C) (Oral)   19 97 9 °F (36 6 °C) (Oral)   19 98 5 °F (36 9 °C) (Oral)        BP Readings from Last 3 Encounters:   19 104/70   19 102/60   19 107/60         Pulse Readings from Last 3 Encounters:   19 71   19 81   19 78        Physical Exam   Constitutional: She is oriented to person, place, and time  No distress  Under weight   HENT:   Head: Normocephalic and atraumatic  Mouth/Throat: No oropharyngeal exudate  Eyes: Pupils are equal, round, and reactive to light  Conjunctivae are normal    Neck: Normal range of motion  Neck supple  No tracheal deviation present  Cardiovascular: Normal rate and regular rhythm  Exam reveals no gallop and no friction rub  No murmur heard  Pulmonary/Chest: Effort normal and breath sounds normal  No respiratory distress  She has no wheezes  She has no rales  She exhibits no tenderness  Abdominal: Soft  She exhibits no distension  There is no tenderness  Musculoskeletal: Normal range of motion  Lymphadenopathy:     She has cervical adenopathy (Small axillary lymphadenopathy especially in the right side measuring 1 cm)  Neurological: She is alert and oriented to person, place, and time  Skin: Skin is warm and dry  No rash noted  She is not diaphoretic  No erythema  No pallor  Psychiatric: She has a normal mood and affect  Her behavior is normal  Judgment and thought content normal    Vitals reviewed  ECO    Goals and Barriers:  Current Goal: Minimize effects of disease  Barriers: None  Patient's Capacity to Self Care:  Patient is able to self care      Code Status: [unfilled]

## 2019-07-09 ENCOUNTER — DOCUMENTATION (OUTPATIENT)
Dept: HEMATOLOGY ONCOLOGY | Facility: CLINIC | Age: 74
End: 2019-07-09

## 2019-07-09 NOTE — PROGRESS NOTES
7/5/2019  Received notification from clinical pt will be starting Revlimid 10 mg    Pt has [employer]  ID #[id number]  BIN #[id number]  PCN #[id number]  GRP #[id number]    Submitted for auth  Through cover my meds    7/9/2019 checked auth status on cover my meds  Per cover my  Meds, there was an issue with this going through   Resubmitted for auth  Received approval letter from [address]  Per the letter, Jean Marie Song is valid from 5/9/2019 through 7/8/2020    Notified clinical, financial, and [facility]  Per [facility], since they can not supply Revlimid an diplomat is not able to bill [facility] as secondary for revlimid, this needs to go to [employer] patient information over to [facility]  7/21/2019  Received notification from clinical:    Patient  was approved for the [employer] PAP until 12-31-19  The prescription can be sent to our free medication pharmacy,   [facility]:  Fax: [phone]  Ph: Anna Frances info:  [facility]  [address] [place] [address], [facility]: [phone]  Fax:  [phone]    [facility] was also notified

## 2019-07-10 DIAGNOSIS — C82.48 GRADE 3B FOLLICULAR LYMPHOMA OF LYMPH NODES OF MULTIPLE REGIONS (HCC): Primary | ICD-10-CM

## 2019-07-18 ENCOUNTER — TELEPHONE (OUTPATIENT)
Dept: HEMATOLOGY ONCOLOGY | Facility: CLINIC | Age: 74
End: 2019-07-18

## 2019-07-18 ENCOUNTER — DOCUMENTATION (OUTPATIENT)
Dept: HEMATOLOGY ONCOLOGY | Facility: CLINIC | Age: 74
End: 2019-07-18

## 2019-07-18 NOTE — TELEPHONE ENCOUNTER
Patient called requesting to speak with Beige regarding chemo and medication  She did not wish to elaborate further  Best call back [phone]

## 2019-07-18 NOTE — TELEPHONE ENCOUNTER
Patient called in wanting to speak with Beige  She called and spoke with me earlier, and then called after lunch and was on hold 10 minutes waiting for a transfer  Please call and advise    [phone]

## 2019-07-18 NOTE — PROGRESS NOTES
Patient's prescription sent to [facility] per e-mail's received  Received call from patient who stated she was not going to get the medication because it is to expensive  She stated the rep from [employer] quoted a price of 743  57  Placed call to [facility] and spoke with [place] who stated they had not set up shipment because the patient had not paid the copay  Asked why we were not informed of copay she stated it was not their job  Sent e-mail to [facility]  Who investigated  Received call from Nella who stated there was a miscommunication with the patient  The medication is going thru [facility] for a copay of $15  Call to patient to advise of copay and notifiy her to contact the pharmacy to set up shipment  7-22-29    Patient  was approved for the [employer] PAP until 12-31-19  The prescription can be sent to our free medication pharmacy,   [facility]:  Fax: [phone]  Ph: Leanne More info:  [facility]  [address] [place] [address], [address]  Inessa Olson 13: [phone]  Fax:  [phone]    Epic NOTED   E-MAIL to team, Patient notifed

## 2019-07-18 NOTE — TELEPHONE ENCOUNTER
Called patient and she stated that she does not want to take Revlimid at this time due to her heart and stroke history  I informed her that I will review with Dr Mai Rojas and see what he recommends  Patient verbalized understanding  Informed Dr Mai Rojas and he stated he would call the patient to review

## 2019-07-22 ENCOUNTER — TELEPHONE (OUTPATIENT)
Dept: HEMATOLOGY ONCOLOGY | Facility: CLINIC | Age: 74
End: 2019-07-22

## 2019-07-22 DIAGNOSIS — C82.48 GRADE 3B FOLLICULAR LYMPHOMA OF LYMPH NODES OF MULTIPLE REGIONS (HCC): ICD-10-CM

## 2019-07-22 NOTE — TELEPHONE ENCOUNTER
Patient called requesting lab orders be faxed to [facility] at Fax # [phone]  Stated she would be there in the next hour   Please fax orders as needed

## 2019-07-23 ENCOUNTER — DOCUMENTATION (OUTPATIENT)
Dept: HEMATOLOGY ONCOLOGY | Facility: CLINIC | Age: 74
End: 2019-07-23

## 2019-07-23 ENCOUNTER — HOSPITAL ENCOUNTER (OUTPATIENT)
Dept: INFUSION CENTER | Facility: CLINIC | Age: 74
Discharge: HOME | End: 2019-07-23
Payer: COMMERCIAL

## 2019-07-23 ENCOUNTER — TELEPHONE (OUTPATIENT)
Dept: HEMATOLOGY ONCOLOGY | Facility: CLINIC | Age: 74
End: 2019-07-23

## 2019-07-23 VITALS
WEIGHT: 91 LBS | TEMPERATURE: 97.7 F | HEIGHT: 59 IN | BODY MASS INDEX: 18.35 KG/M2 | HEART RATE: 65 BPM | RESPIRATION RATE: 18 BRPM | DIASTOLIC BLOOD PRESSURE: 80 MMHG | OXYGEN SATURATION: 98 % | SYSTOLIC BLOOD PRESSURE: 122 MMHG

## 2019-07-23 DIAGNOSIS — C82.48 GRADE 3B FOLLICULAR LYMPHOMA OF LYMPH NODES OF MULTIPLE REGIONS (HCC): ICD-10-CM

## 2019-07-23 DIAGNOSIS — C82.88 OTHER TYPE OF FOLLICULAR LYMPHOMA OF LYMPH NODES OF MULTIPLE REGIONS (HCC): ICD-10-CM

## 2019-07-23 DIAGNOSIS — C82.48 GRADE 3B FOLLICULAR LYMPHOMA OF LYMPH NODES OF MULTIPLE REGIONS (HCC): Primary | ICD-10-CM

## 2019-07-23 PROCEDURE — 96415 CHEMO IV INFUSION ADDL HR: CPT

## 2019-07-23 PROCEDURE — 96413 CHEMO IV INFUSION 1 HR: CPT

## 2019-07-23 PROCEDURE — 96367 TX/PROPH/DG ADDL SEQ IV INF: CPT

## 2019-07-23 RX ORDER — ACETAMINOPHEN 325 MG/1
650 TABLET ORAL ONCE
Status: DISCONTINUED | OUTPATIENT
Start: 2019-07-23 | End: 2019-07-26 | Stop reason: HOSPADM

## 2019-07-23 RX ORDER — SODIUM CHLORIDE 9 MG/ML
20 INJECTION, SOLUTION INTRAVENOUS ONCE
Status: COMPLETED | OUTPATIENT
Start: 2019-07-23 | End: 2019-07-23

## 2019-07-23 RX ORDER — ACETAMINOPHEN 325 MG/1
650 TABLET ORAL ONCE
Status: CANCELLED | OUTPATIENT
Start: 2019-08-22

## 2019-07-23 RX ORDER — SODIUM CHLORIDE 9 MG/ML
20 INJECTION, SOLUTION INTRAVENOUS ONCE
Status: CANCELLED | OUTPATIENT
Start: 2019-08-22

## 2019-07-23 RX ADMIN — SODIUM CHLORIDE 20 ML/HR: 0.9 INJECTION, SOLUTION INTRAVENOUS at 10:12

## 2019-07-23 RX ADMIN — DIPHENHYDRAMINE HYDROCHLORIDE 25 MG: 50 INJECTION INTRAMUSCULAR; INTRAVENOUS at 11:27

## 2019-07-23 RX ADMIN — RITUXIMAB 500 MG: 10 INJECTION, SOLUTION INTRAVENOUS at 12:23

## 2019-07-23 NOTE — TELEPHONE ENCOUNTER
Called patient and left  regarding her revlimid script  She had previously told me she did not want to start the medication  [facility] specialty is asking for a prescription to be provided, but I need the patient to confirm if she is going to take the medication

## 2019-07-24 ENCOUNTER — TELEPHONE (OUTPATIENT)
Dept: HEMATOLOGY ONCOLOGY | Facility: CLINIC | Age: 74
End: 2019-07-24

## 2019-07-24 NOTE — TELEPHONE ENCOUNTER
Pt called stating she still is undecided about taking revlimid and that she keeps getting a phone call from the pharmacy about it  Explained that we will order the medication when she makes the decision to take it  Pharmacy was called to update

## 2019-07-26 ENCOUNTER — DOCUMENTATION (OUTPATIENT)
Dept: HEMATOLOGY ONCOLOGY | Facility: CLINIC | Age: 74
End: 2019-07-26

## 2019-07-26 NOTE — PROGRESS NOTES
recvd message from herminia at [employer] & she wantsme to call her back  Called her & she was telling me that the pt was getting a lot of calls from [facility] it was overwhelming to the pt so she hasn't made up her mind if she is going to be taking the revlimid  Once the pt makes the decision about the revlimid if she wants to take it then the script & the auth need to be faxed to [facility], [facility]  Pt was approved for the free drug  Called pt got her voicemail  left her a message to call me back

## 2019-08-12 ENCOUNTER — OFFICE VISIT (OUTPATIENT)
Dept: HEMATOLOGY ONCOLOGY | Facility: CLINIC | Age: 74
End: 2019-08-12
Payer: COMMERCIAL

## 2019-08-12 VITALS
SYSTOLIC BLOOD PRESSURE: 114 MMHG | BODY MASS INDEX: 18.85 KG/M2 | TEMPERATURE: 97.2 F | HEIGHT: 59 IN | RESPIRATION RATE: 14 BRPM | WEIGHT: 93.5 LBS | HEART RATE: 70 BPM | OXYGEN SATURATION: 99 % | DIASTOLIC BLOOD PRESSURE: 70 MMHG

## 2019-08-12 DIAGNOSIS — C82.88 OTHER TYPE OF FOLLICULAR LYMPHOMA OF LYMPH NODES OF MULTIPLE REGIONS (HCC): ICD-10-CM

## 2019-08-12 DIAGNOSIS — C82.48 GRADE 3B FOLLICULAR LYMPHOMA OF LYMPH NODES OF MULTIPLE REGIONS (HCC): Primary | ICD-10-CM

## 2019-08-12 DIAGNOSIS — C82.48 GRADE 3B FOLLICULAR LYMPHOMA OF LYMPH NODES OF MULTIPLE REGIONS (HCC): ICD-10-CM

## 2019-08-12 PROCEDURE — 99214 OFFICE O/P EST MOD 30 MIN: CPT | Performed by: INTERNAL MEDICINE

## 2019-08-12 RX ORDER — ACETAMINOPHEN 325 MG/1
650 TABLET ORAL ONCE
Status: CANCELLED | OUTPATIENT
Start: 2019-10-17

## 2019-08-12 RX ORDER — SODIUM CHLORIDE 9 MG/ML
20 INJECTION, SOLUTION INTRAVENOUS ONCE
Status: CANCELLED | OUTPATIENT
Start: 2019-11-14

## 2019-08-12 RX ORDER — SODIUM CHLORIDE 9 MG/ML
20 INJECTION, SOLUTION INTRAVENOUS ONCE
Status: CANCELLED | OUTPATIENT
Start: 2019-09-19

## 2019-08-12 RX ORDER — ACETAMINOPHEN 325 MG/1
650 TABLET ORAL ONCE
Status: CANCELLED | OUTPATIENT
Start: 2019-09-19

## 2019-08-12 RX ORDER — ACETAMINOPHEN 325 MG/1
650 TABLET ORAL ONCE
Status: CANCELLED | OUTPATIENT
Start: 2019-11-14

## 2019-08-12 RX ORDER — SODIUM CHLORIDE 9 MG/ML
20 INJECTION, SOLUTION INTRAVENOUS ONCE
Status: CANCELLED | OUTPATIENT
Start: 2019-10-17

## 2019-08-12 NOTE — PROGRESS NOTES
Hematology Outpatient Follow - Up Note  Mikal Gaitan 68 y o  female MRN: @ Encounter: [id number]        Date:  8/12/2019        Assessment/ Plan:    Grade 3A follicular lymphoma, relapsed diagnosed initially in 2009, heavily pretreated, stage IV with lymphadenopathy above and below the diaphragm, with bone marrow involvement    She could not tolerate ibrutinib because of nausea, vomiting, bloating, hallucination, weight loss, fatigue    She will not take rituximab concurrent with lenalidomide    Currently she finished rituximab weekly x4 and currently on rituximab 1 dose every months, proceed with dose 2  Follow-up monthly with CBC, CMP    She has disappearance of constitutional symptoms    Imaging studies after cycle 4   Or 6 of rituximab        HPI:  78-year-old  female with history of obsessive-compulsive disorder, stage IV follicular lymphoma diagnosed in 2009 with constitutional symptoms, retroperitoneal lymphadenopathy, inguinal lymphadenopathy, she received single agent rituximab in September 2010 every week x4 with excellent response, CT/PET scan in June 2012 showed significant decrease in the lymph node size  Later on she had relapse in 2012 she was treated with bendamustine/rituximab for 4 cycles complicated with herpes zoster involving the right shoulder, right cervical and breast area repeat PET scan showed persistent activity in the right inguinal area consistent with recurrent/persistent lymphoma she received radiation therapy to that area finished in August 2013     Right upper pulmonary nodule she declined surgery in October 2015 biopsy was inconclusive this was watched, however because of the appearance and heavy smoking abuse she received stereotactic radiation therapy to that area in March 2018     Recurrence of lymphoma in March 2019 with right axillary lymphadenopathy, PET scan on 04/09/2019 showed FDG avid lymph nodes in the neck, right axilla, mediastinum, upper abdomen, mesentery with most extensive disease in the abdomen and scattered osseous metastasis  She could not tolerate ibrutinib at 420 mg p  O  Daily for 2 weeks with nausea, vomiting, abdominal pain, fatigue, bloating, she discontinued ibrutinib     She received rituximab 375 milligram/meter squared weekly x4 doses finished in June 2019  And then with start the patient on lenalidomide 10 mg p o  Daily 3 weeks on 1 week off however she declined to take it concurrent with rituximab      Interval History:        Previous Treatment:         Test Results:    Imaging: No results found  Labs:   Lab Results   Component Value Date    WBC 4 54 06/18/2019    HGB 14 6 06/18/2019    HCT 44 3 06/18/2019    MCV 98 06/18/2019     06/18/2019     Lab Results   Component Value Date     01/09/2018    K 3 8 06/18/2019     06/18/2019    CO2 29 06/18/2019    ANIONGAP 6 11/05/2015    BUN 11 06/18/2019    CREATININE 0 74 06/18/2019    GLUCOSE 104 (H) 01/09/2018    GLUF 106 (H) 06/18/2019    CALCIUM 8 8 06/18/2019    AST 23 06/18/2019    ALT 28 06/18/2019    ALKPHOS 101 06/18/2019    PROT 7 1 01/09/2018    BILITOT 0 3 01/09/2018    EGFR 81 06/18/2019       No results found for: IRON, TIBC, FERRITIN    Lab Results   Component Value Date    [id number] 598 02/16/2018         ROS:   Review of Systems   Constitutional: Positive for unexpected weight change (2 lb gain)  Negative for activity change, appetite change, diaphoresis, fatigue and fever  HENT: Negative for facial swelling, hearing loss, rhinorrhea, sinus pressure, sinus pain, sneezing, sore throat and tinnitus  Eyes: Negative for photophobia, pain, discharge, redness, itching and visual disturbance  Respiratory: Negative for apnea and chest tightness  Cardiovascular: Negative for chest pain, palpitations and leg swelling  Gastrointestinal: Negative for abdominal distention, abdominal pain, blood in stool, constipation, diarrhea, nausea, rectal pain and vomiting  Endocrine: Negative for cold intolerance, heat intolerance, polydipsia and polyphagia  Genitourinary: Negative for difficulty urinating, dyspareunia, frequency, hematuria, pelvic pain and urgency  Musculoskeletal: Negative for arthralgias, back pain, gait problem, joint swelling and myalgias  Skin: Negative for color change, pallor and rash  Allergic/Immunologic: Negative for environmental allergies and food allergies  Neurological: Negative for dizziness, tremors, seizures, syncope, speech difficulty, numbness and headaches  Hematological: Negative for adenopathy  Does not bruise/bleed easily  Psychiatric/Behavioral: Negative for agitation, confusion, dysphoric mood, hallucinations and suicidal ideas  Current Medications: Reviewed  Allergies: Reviewed  PMH/FH/SH:  Reviewed      Physical Exam:    Body surface area is 1 33 meters squared  Wt Readings from Last 3 Encounters:   08/12/19 42 4 kg (93 lb 8 oz)   07/23/19 41 3 kg (91 lb)   07/05/19 41 3 kg (91 lb)        Temp Readings from Last 3 Encounters:   08/12/19 (!) 97 2 °F (36 2 °C)   07/23/19 97 7 °F (36 5 °C) (Oral)   07/05/19 98 1 °F (36 7 °C) (Oral)        BP Readings from Last 3 Encounters:   08/12/19 114/70   07/23/19 122/80   07/05/19 104/70         Pulse Readings from Last 3 Encounters:   08/12/19 70   07/23/19 65   07/05/19 71        Physical Exam   Constitutional: She is oriented to person, place, and time  She appears well-developed and well-nourished  No distress  HENT:   Head: Normocephalic and atraumatic  Eyes: Conjunctivae are normal    Neck: Normal range of motion  Neck supple  No tracheal deviation present  Cardiovascular: Normal rate and regular rhythm  Exam reveals no gallop and no friction rub  No murmur heard  Pulmonary/Chest: Effort normal and breath sounds normal  No respiratory distress  She has no wheezes  She has no rales  She exhibits no tenderness  Abdominal: Soft  She exhibits no distension   There is no tenderness  Musculoskeletal:   Moderate kyphosis   Lymphadenopathy:     She has no cervical adenopathy  Neurological: She is alert and oriented to person, place, and time  Skin: Skin is warm and dry  She is not diaphoretic  No erythema  No pallor  Psychiatric: She has a normal mood and affect  Her behavior is normal  Judgment and thought content normal    Vitals reviewed  ECO  Goals and Barriers:  Current Goal: Minimize effects of disease  Barriers: None  Patient's Capacity to Self Care:  Patient is able to self care      Code Status: @[facility]@

## 2019-08-22 ENCOUNTER — HOSPITAL ENCOUNTER (OUTPATIENT)
Dept: INFUSION CENTER | Facility: HOSPITAL | Age: 74
Discharge: HOME | End: 2019-08-22
Attending: INTERNAL MEDICINE
Payer: COMMERCIAL

## 2019-08-22 VITALS
HEIGHT: 59 IN | TEMPERATURE: 97.7 F | HEART RATE: 78 BPM | RESPIRATION RATE: 18 BRPM | BODY MASS INDEX: 18.76 KG/M2 | WEIGHT: 93.03 LBS | DIASTOLIC BLOOD PRESSURE: 73 MMHG | SYSTOLIC BLOOD PRESSURE: 145 MMHG | OXYGEN SATURATION: 100 %

## 2019-08-22 DIAGNOSIS — C82.88 OTHER TYPE OF FOLLICULAR LYMPHOMA OF LYMPH NODES OF MULTIPLE REGIONS (HCC): ICD-10-CM

## 2019-08-22 DIAGNOSIS — C82.48 GRADE 3B FOLLICULAR LYMPHOMA OF LYMPH NODES OF MULTIPLE REGIONS (HCC): Primary | ICD-10-CM

## 2019-08-22 PROCEDURE — 96415 CHEMO IV INFUSION ADDL HR: CPT

## 2019-08-22 PROCEDURE — 96367 TX/PROPH/DG ADDL SEQ IV INF: CPT

## 2019-08-22 PROCEDURE — 96413 CHEMO IV INFUSION 1 HR: CPT

## 2019-08-22 RX ORDER — ACETAMINOPHEN 325 MG/1
650 TABLET ORAL ONCE
Status: DISCONTINUED | OUTPATIENT
Start: 2019-08-22 | End: 2019-08-25 | Stop reason: HOSPADM

## 2019-08-22 RX ORDER — SODIUM CHLORIDE 9 MG/ML
20 INJECTION, SOLUTION INTRAVENOUS ONCE
Status: COMPLETED | OUTPATIENT
Start: 2019-08-22 | End: 2019-08-22

## 2019-08-22 RX ADMIN — DIPHENHYDRAMINE HYDROCHLORIDE 25 MG: 50 INJECTION, SOLUTION INTRAMUSCULAR; INTRAVENOUS at 08:55

## 2019-08-22 RX ADMIN — SODIUM CHLORIDE 20 ML/HR: 0.9 INJECTION, SOLUTION INTRAVENOUS at 08:51

## 2019-08-22 RX ADMIN — RITUXIMAB 500 MG: 10 INJECTION, SOLUTION INTRAVENOUS at 09:47

## 2019-08-22 NOTE — PLAN OF CARE
Problem: Potential for Falls  Goal: Patient will remain free of falls  Description  INTERVENTIONS:  - Assess patient frequently for physical needs  -  Identify cognitive and physical deficits and behaviors that affect risk of falls    -  [place] fall precautions as indicated by assessment   - Educate patient/family on patient safety including physical limitations  - Instruct patient to call for assistance with activity based on assessment  - Modify environment to reduce risk of injury  - Consider OT/PT consult to assist with strengthening/mobility  Outcome: Progressing

## 2019-09-18 ENCOUNTER — TELEPHONE (OUTPATIENT)
Dept: HEMATOLOGY ONCOLOGY | Facility: CLINIC | Age: 74
End: 2019-09-18

## 2019-09-18 NOTE — TELEPHONE ENCOUNTER
The patient called in and requested labs be sent to [facility] labs at [phone]  They were sent

## 2019-09-19 ENCOUNTER — HOSPITAL ENCOUNTER (OUTPATIENT)
Dept: INFUSION CENTER | Facility: CLINIC | Age: 74
Discharge: HOME | End: 2019-09-19
Payer: COMMERCIAL

## 2019-09-19 VITALS
TEMPERATURE: 97.8 F | HEART RATE: 78 BPM | BODY MASS INDEX: 18.75 KG/M2 | WEIGHT: 93 LBS | RESPIRATION RATE: 18 BRPM | DIASTOLIC BLOOD PRESSURE: 76 MMHG | HEIGHT: 59 IN | SYSTOLIC BLOOD PRESSURE: 132 MMHG

## 2019-09-19 DIAGNOSIS — C82.88 OTHER TYPE OF FOLLICULAR LYMPHOMA OF LYMPH NODES OF MULTIPLE REGIONS (HCC): ICD-10-CM

## 2019-09-19 DIAGNOSIS — C82.48 GRADE 3B FOLLICULAR LYMPHOMA OF LYMPH NODES OF MULTIPLE REGIONS (HCC): Primary | ICD-10-CM

## 2019-09-19 PROCEDURE — 96413 CHEMO IV INFUSION 1 HR: CPT

## 2019-09-19 PROCEDURE — 96415 CHEMO IV INFUSION ADDL HR: CPT

## 2019-09-19 PROCEDURE — 96367 TX/PROPH/DG ADDL SEQ IV INF: CPT

## 2019-09-19 RX ORDER — SODIUM CHLORIDE 9 MG/ML
20 INJECTION, SOLUTION INTRAVENOUS ONCE
Status: COMPLETED | OUTPATIENT
Start: 2019-09-19 | End: 2019-09-19

## 2019-09-19 RX ORDER — ACETAMINOPHEN 325 MG/1
650 TABLET ORAL ONCE
Status: DISCONTINUED | OUTPATIENT
Start: 2019-09-19 | End: 2019-09-22 | Stop reason: HOSPADM

## 2019-09-19 RX ADMIN — DIPHENHYDRAMINE HYDROCHLORIDE 25 MG: 50 INJECTION, SOLUTION INTRAMUSCULAR; INTRAVENOUS at 09:35

## 2019-09-19 RX ADMIN — RITUXIMAB 500 MG: 10 INJECTION, SOLUTION INTRAVENOUS at 10:09

## 2019-09-19 RX ADMIN — SODIUM CHLORIDE 20 ML/HR: 0.9 INJECTION, SOLUTION INTRAVENOUS at 09:28

## 2019-09-19 NOTE — PROGRESS NOTES
Patient tolerated treatment today without issues  Patient verified upcoming appointment and AVS provided

## 2019-09-19 NOTE — PLAN OF CARE
Problem: Potential for Falls  Goal: Patient will remain free of falls  Description  INTERVENTIONS:  - Assess patient frequently for physical needs  -  Identify cognitive and physical deficits and behaviors that affect risk of falls    -  [place] fall precautions as indicated by assessment   - Educate patient/family on patient safety including physical limitations  - Instruct patient to call for assistance with activity based on assessment  - Modify environment to reduce risk of injury  - Consider OT/PT consult to assist with strengthening/mobility  Outcome: Progressing

## 2019-09-20 ENCOUNTER — TELEPHONE (OUTPATIENT)
Dept: HEMATOLOGY ONCOLOGY | Facility: CLINIC | Age: 74
End: 2019-09-20

## 2019-09-20 NOTE — TELEPHONE ENCOUNTER
Pt called asking about blood work results that they just had done on 09/18/2019  And had concern about kidneys with going through chemo

## 2019-10-11 ENCOUNTER — TELEPHONE (OUTPATIENT)
Dept: HEMATOLOGY ONCOLOGY | Facility: CLINIC | Age: 74
End: 2019-10-11

## 2020-01-13 NOTE — TELEPHONE ENCOUNTER
Stated that the pt called them stating that she needs more radiation  Eufemia Mishra has not rec'vd a consult from our office for this pt and they would like one  Pt does have a f/u on 2 23 18 but they would still like a consult put in  Eucrisa Pregnancy And Lactation Text: This medication has not been assigned a Pregnancy Risk Category but animal studies failed to show danger with the topical medication. It is unknown if the medication is excreted in breast milk.

## 2024-02-28 NOTE — PROGRESS NOTES
Patient here for rituxan and is doing well, no c/o offered  She would like to refuse her tylenol today as she does not feel good on tylenol for many years when she takes a dose  She did take a claritin as advised by Dr Omi Peterson and will get benadryl prior to treatment today 
Patient tolerated treatment without incident and was discharged post   She offers no c/o  AVS given  She does not wish to schedule her next appointment until her f/u with Dr Elyn Kanner when she will decide if she will start revlimid  Next rituxan due in 1 month  F/u with Dr Elyn Kanner on 8/12/19 
none